# Patient Record
Sex: MALE | Race: WHITE | NOT HISPANIC OR LATINO | Employment: FULL TIME | URBAN - NONMETROPOLITAN AREA
[De-identification: names, ages, dates, MRNs, and addresses within clinical notes are randomized per-mention and may not be internally consistent; named-entity substitution may affect disease eponyms.]

---

## 2018-04-15 ENCOUNTER — HOSPITAL ENCOUNTER (EMERGENCY)
Facility: HOSPITAL | Age: 43
Discharge: HOME | End: 2018-04-15
Attending: NURSE PRACTITIONER | Admitting: NURSE PRACTITIONER
Payer: COMMERCIAL

## 2018-04-15 VITALS
HEART RATE: 85 BPM | HEIGHT: 73 IN | DIASTOLIC BLOOD PRESSURE: 89 MMHG | TEMPERATURE: 97.5 F | SYSTOLIC BLOOD PRESSURE: 138 MMHG | OXYGEN SATURATION: 99 % | RESPIRATION RATE: 14 BRPM

## 2018-04-15 DIAGNOSIS — J02.0 STREP THROAT: ICD-10-CM

## 2018-04-15 LAB
DEPRECATED S PYO AG THROAT QL EIA: ABNORMAL
SPECIMEN SOURCE: ABNORMAL

## 2018-04-15 PROCEDURE — 99203 OFFICE O/P NEW LOW 30 MIN: CPT | Performed by: NURSE PRACTITIONER

## 2018-04-15 PROCEDURE — 87880 STREP A ASSAY W/OPTIC: CPT | Performed by: FAMILY MEDICINE

## 2018-04-15 PROCEDURE — G0463 HOSPITAL OUTPT CLINIC VISIT: HCPCS

## 2018-04-15 RX ORDER — AMOXICILLIN 500 MG/1
500 CAPSULE ORAL 2 TIMES DAILY
Qty: 20 CAPSULE | Refills: 0 | Status: SHIPPED | OUTPATIENT
Start: 2018-04-15 | End: 2018-04-25

## 2018-04-15 ASSESSMENT — ENCOUNTER SYMPTOMS
RHINORRHEA: 1
SORE THROAT: 1
CHILLS: 1
APPETITE CHANGE: 0
FATIGUE: 1
FEVER: 1
DIAPHORESIS: 1

## 2018-04-15 NOTE — ED AVS SNAPSHOT
[facility]    [address] [place] [address]    Phone:  [phone]                                       Bert Naidu   MRN: [id number]    Department:  [facility]   Date of Visit:  4/15/2018           After Visit Summary Signature Page     I have received my discharge instructions, and my questions have been answered. I have discussed any challenges I see with this plan with the nurse or doctor.    ..........................................................................................................................................  Patient/Patient Representative Signature      ..........................................................................................................................................  Patient Representative Print Name and Relationship to Patient    ..................................................               ................................................  Date                                            Time    ..........................................................................................................................................  Reviewed by Signature/Title    ...................................................              ..............................................  Date                                                            Time

## 2018-04-15 NOTE — ED AVS SNAPSHOT
[facility]    [address] [address]    Phone:  [phone]                                       Bert Naidu   MRN: [id number]    Department:  [facility]   Date of Visit:  4/15/2018           Patient Information     Date Of Birth          1975        Your diagnoses for this visit were:     Strep throat        You were seen by Kaley Barker NP.      Follow-up Information     Follow up with [facility].    Specialty:  EMERGENCY MEDICINE    Why:  As needed, If symptoms worsen, or concerns develop    Contact information:    [address] [address] [phone]    Additional information:    From [place]: Take [address]. Turn left at [address]. Turn left at the first stoplight on [address]. At the first stop sign, take a right onto [address]. Take a left into the parking lot and continue through until you reach the North enterance of the building.       From [place]: Take [address]. Take the [address] ramp towards [place]. Turn left onto [address]. Take a slight right onto [address]. Turn left at the first stoplight on [address]. At the first stop sign, take a right onto [address]. Take a left into the parking lot and continue through until you reach the North enterance of the building.       From [place]: Take [address]. Take a right at [address]. At the first stop sign, take a right onto [address]. Take a left into the parking lot and continue through until you reach the North enterance of the building.         Follow up with No Ref-Primary, Physician.    Why:  As needed, if symptoms do not improve        Discharge Instructions         Pharyngitis: Strep (Confirmed)    You have had a positive test for strep throat. Strep throat is a contagious illness. It is spread by coughing, kissing or by touching others after  touching your mouth or nose. Symptoms include throat pain that is worse with swallowing, aching all over, headache, and fever. It is treated with antibiotic medicine. This should help you start to feel better in 1 to 2 days.  Home care    Rest at home. Drink plenty of fluids to you won't get dehydrated.    No work or school for the first 2 days of taking the antibiotics. After this time, you will not be contagious. You can then return to school or work if you are feeling better.     Take antibiotic medicine for the full 10 days, even if you feel better. This is very important to ensure the infection is treated. It is also important to prevent medicine-resistant germs from developing. If you were given an antibiotic shot, you don't need any more antibiotics.    You may use acetaminophen or ibuprofen to control pain or fever, unless another medicine was prescribed for this. Talk with your doctor before taking these medicines if you have chronic liver or kidney disease. Also talk with your doctor if you have had a stomach ulcer or GI bleeding.    Throat lozenges or sprays help reduce pain. Gargling with warm saltwater will also reduce throat pain. Dissolve 1/2 teaspoon of salt in 1 glass of warm water. This may be useful just before meals.     Soft foods are OK. Avoid salty or spicy foods.  Follow-up care  Follow up with your healthcare provider or our staff if you don't get better over the next week.  When to seek medical advice  Call your healthcare provider right away if any of these occur:    Fever of 100.4 F (38 C) or higher, or as directed by your healthcare provider    New or worsening ear pain, sinus pain, or headache    Painful lumps in the back of neck    Stiff neck    Lymph nodes getting larger or becoming soft in the middle    You can't swallow liquids or you can't open your mouth wide because of throat pain    Signs of dehydration. These include very dark urine or no urine, sunken eyes, and  "dizziness.    Trouble breathing or noisy breathing    Muffled voice    Rash  Date Last Reviewed: 4/13/2015 2000-2017 [employer]. [address] [place] [address], [place] [address]. All rights reserved. This information is not intended as a substitute for professional medical care. Always follow your healthcare professional's instructions.             Review of your medicines      START taking        Dose / Directions Last dose taken    amoxicillin 500 MG capsule   Commonly known as:  AMOXIL   Dose:  500 mg   Quantity:  20 capsule        Take 1 capsule (500 mg) by mouth 2 times daily for 10 days   Refills:  0                Prescriptions were sent or printed at these locations (1 Prescription)                   [facility] - [place], [place] - [address] AT [facility] [address]   [address], [place] [address]    Telephone:  [phone]   Fax:  [phone]   Hours:                  E-Prescribed (1 of 1)         amoxicillin (AMOXIL) 500 MG capsule                Procedures and tests performed during your visit     Rapid strep screen      Orders Needing Specimen Collection     None      Pending Results     No orders found from 4/13/2018 to 4/16/2018.            Pending Culture Results     No orders found from 4/13/2018 to 4/16/2018.            Thank you for choosing [facility]       Thank you for choosing [facility] for your care. Our goal is always to provide you with excellent care. Hearing back from our patients is one way we can continue to improve our services. Please take a few minutes to complete the written survey that you may receive in the mail after you visit with us. Thank you!        [employer] Information     "[employer] lets you send messages to your doctor, view your test results, renew your prescriptions, schedule appointments and more. To sign up, go to [email or web address] . Click on \"Log in\" on the left side of the screen, which will take you to the Welcome page. Then click " "on \"Sign up Now\" on the right side of the page.     You will be asked to enter the access code listed below, as well as some personal information. Please follow the directions to create your username and password.     Your access code is: [email or web address]  Expires: 2018 10:52 AM     Your access code will  in 90 days. If you need help or a new code, please call your [place] clinic or [phone].        Care EveryWhere ID     This is your Care EveryWhere ID. This could be used by other organizations to access your [place] medical records  [id number]        Equal Access to Services     [facility]: Hadjennifer Acosta, mary gee, jay jay luu, vira fernando . So [facility] [phone].    ATENCIÓN: Si habla español, tiene a carney disposición servicios gratuitos de asistencia lingüística. Llame al [phone].    We comply with applicable federal civil rights laws and [place] laws. We do not discriminate on the basis of race, color, national origin, age, disability, sex, sexual orientation, or gender identity.            After Visit Summary       This is your record. Keep this with you and show to your community pharmacist(s) and doctor(s) at your next visit.                  "

## 2018-04-15 NOTE — ED PROVIDER NOTES
"  History     Chief Complaint   Patient presents with     Pharyngitis     X 2 days with strep exposure     HPI  Bert Naidu is a 42 year old male who presents today with a CC of sore throat x 2 days.  He has been having chills and sweats,. No measured fevers.  He was exposed to daughter and grand child with strep.      Problem List:    There are no active problems to display for this patient.       Past Medical History:    History reviewed. No pertinent past medical history.    Past Surgical History:    No past surgical history on file.    Family History:    No family history on file.    Social History:  Marital Status:  Single [1]  Social History   Substance Use Topics     Smoking status: Not on file     Smokeless tobacco: Not on file     Alcohol use Not on file        Medications:      amoxicillin (AMOXIL) 500 MG capsule         Review of Systems   Constitutional: Positive for chills, diaphoresis, fatigue and fever. Negative for appetite change.   HENT: Positive for rhinorrhea and sore throat.        Physical Exam   BP: 138/89  Pulse: 85  Temp: 97.5  F (36.4  C)  Resp: 14  Height: 185.4 cm (6' 1\")  SpO2: 99 %      Physical Exam   Constitutional: He is oriented to person, place, and time. He appears well-developed. He is cooperative.   HENT:   Head: Normocephalic and atraumatic.   Right Ear: Tympanic membrane, external ear and ear canal normal.   Left Ear: Tympanic membrane, external ear and ear canal normal.   Mouth/Throat: Uvula is midline. No trismus in the jaw. Posterior oropharyngeal edema and posterior oropharyngeal erythema present. No tonsillar abscesses.   Eyes: Conjunctivae are normal.   Neck: Normal range of motion. Neck supple.   Cardiovascular: Normal rate and regular rhythm.    Pulmonary/Chest: Effort normal and breath sounds normal.   Lymphadenopathy:        Head (right side): Tonsillar adenopathy present.        Head (left side): Tonsillar adenopathy present.   Neurological: He is alert and " "oriented to person, place, and time.   Skin: Skin is warm and dry.   Psychiatric: He has a normal mood and affect. His behavior is normal.   Nursing note and vitals reviewed.      ED Course     ED Course     Procedures    Results for orders placed or performed during the hospital encounter of 04/15/18   Rapid strep screen   Result Value Ref Range    Specimen Description Throat     Rapid Strep A Screen (A)      POSITIVE: Group A Streptococcal antigen detected by immunoassay.       Assessments & Plan (with Medical Decision Making)     I have reviewed the nursing notes.    I have reviewed the findings, diagnosis, plan and need for follow up with the patient.  ASSESSMENT / PLAN:  (J02.0) Strep throat  Comment: rapid strep positive with positive exposure  Plan:  Amoxicillin as prescribed   Warm salt water gargles several times per day   Ibuprofen and tylenol per package directions for pain/fever   Cepacol lozenges OTC per package directions   Increase fluids, wash hands frequently, rest   Discard toothbrush after 24-48 hours and get a new one   Patient verbally educated and given appropriate education sheets for their diagnoses and has no questions.   Return to ED/UC if symptoms increase or concerns develop, red flag symptoms as discussed and per discharge instructions:    increasing throat pain, trouble swallowing, \"hot potato voice\", drooling, shortness of breath/airway compromise   Follow up with your Primary Care provider if symptoms do not improve in 2-3 days      New Prescriptions    AMOXICILLIN (AMOXIL) 500 MG CAPSULE    Take 1 capsule (500 mg) by mouth 2 times daily for 10 days       Final diagnoses:   Strep throat       4/15/2018   [facility]     Kaley Barker NP  04/16/18 1118    "

## 2018-04-15 NOTE — DISCHARGE INSTRUCTIONS
Pharyngitis: Strep (Confirmed)    You have had a positive test for strep throat. Strep throat is a contagious illness. It is spread by coughing, kissing or by touching others after touching your mouth or nose. Symptoms include throat pain that is worse with swallowing, aching all over, headache, and fever. It is treated with antibiotic medicine. This should help you start to feel better in 1 to 2 days.  Home care    Rest at home. Drink plenty of fluids to you won't get dehydrated.    No work or school for the first 2 days of taking the antibiotics. After this time, you will not be contagious. You can then return to school or work if you are feeling better.     Take antibiotic medicine for the full 10 days, even if you feel better. This is very important to ensure the infection is treated. It is also important to prevent medicine-resistant germs from developing. If you were given an antibiotic shot, you don't need any more antibiotics.    You may use acetaminophen or ibuprofen to control pain or fever, unless another medicine was prescribed for this. Talk with your doctor before taking these medicines if you have chronic liver or kidney disease. Also talk with your doctor if you have had a stomach ulcer or GI bleeding.    Throat lozenges or sprays help reduce pain. Gargling with warm saltwater will also reduce throat pain. Dissolve 1/2 teaspoon of salt in 1 glass of warm water. This may be useful just before meals.     Soft foods are OK. Avoid salty or spicy foods.  Follow-up care  Follow up with your healthcare provider or our staff if you don't get better over the next week.  When to seek medical advice  Call your healthcare provider right away if any of these occur:    Fever of 100.4 F (38 C) or higher, or as directed by your healthcare provider    New or worsening ear pain, sinus pain, or headache    Painful lumps in the back of neck    Stiff neck    Lymph nodes getting larger or becoming soft in the  middle    You can't swallow liquids or you can't open your mouth wide because of throat pain    Signs of dehydration. These include very dark urine or no urine, sunken eyes, and dizziness.    Trouble breathing or noisy breathing    Muffled voice    Rash  Date Last Reviewed: 4/13/2015 2000-2017 [employer]. [address] [place] [address], [place] [address]. All rights reserved. This information is not intended as a substitute for professional medical care. Always follow your healthcare professional's instructions.

## 2018-04-15 NOTE — ED NOTES
Pt presents today by self with c/o strep exposure two days ago. Pt states multiple family members were dx with strep recently.

## 2020-01-22 ENCOUNTER — HOSPITAL ENCOUNTER (EMERGENCY)
Facility: HOSPITAL | Age: 45
Discharge: HOME | End: 2020-01-22
Attending: NURSE PRACTITIONER | Admitting: NURSE PRACTITIONER
Payer: COMMERCIAL

## 2020-01-22 VITALS
HEIGHT: 72 IN | SYSTOLIC BLOOD PRESSURE: 137 MMHG | WEIGHT: 180 LBS | TEMPERATURE: 96.6 F | RESPIRATION RATE: 16 BRPM | OXYGEN SATURATION: 100 % | DIASTOLIC BLOOD PRESSURE: 96 MMHG | BODY MASS INDEX: 24.38 KG/M2

## 2020-01-22 DIAGNOSIS — R21 RASH: Primary | ICD-10-CM

## 2020-01-22 PROCEDURE — G0463 HOSPITAL OUTPT CLINIC VISIT: HCPCS

## 2020-01-22 PROCEDURE — 99213 OFFICE O/P EST LOW 20 MIN: CPT | Mod: Z6 | Performed by: NURSE PRACTITIONER

## 2020-01-22 RX ORDER — TRIAMCINOLONE ACETONIDE 1 MG/G
CREAM TOPICAL
Qty: 45 G | Refills: 0 | Status: SHIPPED | OUTPATIENT
Start: 2020-01-22 | End: 2020-02-05

## 2020-01-22 RX ORDER — PERMETHRIN 50 MG/G
CREAM TOPICAL ONCE
Qty: 30 G | Refills: 1 | Status: SHIPPED | OUTPATIENT
Start: 2020-01-22 | End: 2020-01-22

## 2020-01-22 RX ORDER — PREDNISONE 20 MG/1
TABLET ORAL
Qty: 10 TABLET | Refills: 0 | Status: SHIPPED | OUTPATIENT
Start: 2020-01-22 | End: 2021-06-27

## 2020-01-22 RX ORDER — CEPHALEXIN 500 MG/1
500 CAPSULE ORAL 4 TIMES DAILY
Qty: 28 CAPSULE | Refills: 0 | Status: SHIPPED | OUTPATIENT
Start: 2020-01-22 | End: 2020-01-29

## 2020-01-22 ASSESSMENT — MIFFLIN-ST. JEOR: SCORE: 1734.47

## 2020-01-22 NOTE — ED PROVIDER NOTES
"  History     Chief Complaint   Patient presents with     Wound Check     HPI  Bert Naidu is a 44 year old male who presents ambulatory with concerns of rash.      Rash  Onset: 1.5 months    Description:   Location: First area was on right medial wrist. 1 week ago started having more areas on forearm and on left hand, forearm  Character: red, round, raised  Itching (Pruritis): YES    Progression of Symptoms:  worsening    Accompanying Signs & Symptoms:  Fever: no   Body aches or joint pain: YES- wrists and hands. Denies ankle or feet pain  Sore throat symptoms: no   Recent cold symptoms: no     History:   Previous similar rash: no     Precipitating factors:   Exposure to similar rash: no   New exposures: None   Recent travel: no     Alleviating factors: nothing    Therapies Tried and outcome: bacitracin without relief, cortisone cream - somewhat helpful. Acetone - \"to kill the bugs\"      Allergies:  No Known Allergies    Problem List:    There are no active problems to display for this patient.       Past Medical History:    No past medical history on file.    Past Surgical History:    No past surgical history on file.    Family History:    No family history on file.    Social History:  Marital Status:  Single [1]  Social History     Tobacco Use     Smoking status: Not on file   Substance Use Topics     Alcohol use: Not on file     Drug use: Not on file        Medications:    cephALEXin (KEFLEX) 500 MG capsule  predniSONE (DELTASONE) 20 MG tablet  triamcinolone (KENALOG) 0.1 % external cream          Review of Systems   Constitutional: Negative for chills and fever.   HENT: Negative for congestion, rhinorrhea and sore throat.    Eyes: Negative for pain, discharge, redness and itching.   Respiratory: Negative for cough.    Gastrointestinal: Negative for abdominal pain, diarrhea, nausea and vomiting.   Musculoskeletal: Positive for arthralgias (wrists, hands) and joint swelling (wrists and hands).   Skin: Positive " for rash.   Neurological: Negative for headaches.   Psychiatric/Behavioral: Negative for self-injury and suicidal ideas.       Physical Exam   BP: 137/96  Heart Rate: 89  Temp: 96.6  F (35.9  C)  Resp: 16  Height: 182.9 cm (6')  Weight: 81.6 kg (180 lb)  SpO2: 100 %      Physical Exam  Constitutional:       General: He is not in acute distress.     Appearance: Normal appearance. He is not ill-appearing, toxic-appearing or diaphoretic.   HENT:      Head: Normocephalic and atraumatic.      Right Ear: Tympanic membrane, ear canal and external ear normal.      Left Ear: Tympanic membrane, ear canal and external ear normal.      Nose: Nose normal.      Mouth/Throat:      Lips: Pink.      Mouth: Mucous membranes are moist.      Pharynx: Oropharynx is clear. Uvula midline. No pharyngeal swelling, oropharyngeal exudate or posterior oropharyngeal erythema.   Eyes:      General: Lids are normal.      Conjunctiva/sclera: Conjunctivae normal.      Pupils: Pupils are equal, round, and reactive to light.   Neck:      Musculoskeletal: Neck supple.   Cardiovascular:      Rate and Rhythm: Normal rate and regular rhythm.      Heart sounds: S1 normal and S2 normal. No murmur. No friction rub. No gallop.    Pulmonary:      Effort: Pulmonary effort is normal. No tachypnea or respiratory distress.      Breath sounds: Normal breath sounds. No wheezing, rhonchi or rales.   Lymphadenopathy:      Cervical: No cervical adenopathy.   Skin:     General: Skin is warm and dry.      Capillary Refill: Capillary refill takes less than 2 seconds.      Coloration: Skin is not pale.      Findings: Rash present.      Comments: Varying sized annular raise plaques to hands and arms   Neurological:      Mental Status: He is alert and oriented to person, place, and time.      Gait: Gait is intact.   Psychiatric:         Mood and Affect: Mood is anxious.         Speech: Speech normal.         Behavior: Behavior normal. Behavior is cooperative.                     ED Course        Procedures          No results found for this or any previous visit (from the past 24 hour(s)).    Medications - No data to display    Assessments & Plan (with Medical Decision Making)     I have reviewed the nursing notes.    I have reviewed the findings, diagnosis, plan and need for follow up with the patient.  (R21) Rash  (primary encounter diagnosis)  Comment: Acute, symptomatic  Plan: He is concerned about bugs in his skin- actually, he is convinced that he has bugs in his skin. He has a pocket knife and is using it to remove bugs that are actually piece of dry skin. There is no convincing or educating him differently. He was seen at [facility] and told rash was consisnte with psoriasis and I agree with this diagnosis. Discussed that we can use permethrin although I do not think this is a parasite and strongly encouraged him to take antibiotics as I am concerned about some areas being infected likely from scratching and using a knife to poke at skin. He denies homicidal, suicidal ideations. Denies auditory hallucinations, elicit drug use.   Rash consistent with psoriasis and I am most concerned about a secondary infection given he is picking at areas  Start prednisone and antibiotic cephalexin as directed.  Triamcinolone to areas.    - Take entire course of antibiotic even if you start to feel better.  - Antibiotics can cause stomach upset including nausea and diarrhea. Read your bottle or ask the pharmacist if antibiotic can be taken with food to help prevent nausea. If you have symptoms of diarrhea you can take an over-the-counter probiotic and/or increase foods with probiotics such as yogurt, Yonis, sauerkraut.        RETURN TO THE ED WITH NEW OR WORSENING SYMPTOMS.    FOLLOW-UP WITH YOUR PRIMARY CARE PROVIDER IN 3-5 DAYS.      Discharge Medication List as of 1/22/2020 10:26 AM      START taking these medications    Details   cephALEXin (KEFLEX) 500 MG capsule Take 1 capsule (500  mg) by mouth 4 times daily for 7 days, Disp-28 capsule, R-0, E-Prescribe      permethrin (ELIMITE) 5 % external cream Apply topically once for 1 dose Massage into skin from head to foot.  Leave on for 8-14hrs and then wash off.  May repeat in 2 wks if needed.Disp-30 g, [id number]      predniSONE (DELTASONE) 20 MG tablet Take two tablets (= 40mg) each day for 5 (five) days, Disp-10 tablet, R-0, E-Prescribe      triamcinolone (KENALOG) 0.1 % external cream 80 gramsDisp-45 g, [id number]             Final diagnoses:   Rash     Ebony Myrick CNP   1/22/2020   [facility]     Ebony Myrick, CHELY  01/26/20 9223

## 2020-01-22 NOTE — ED AVS SNAPSHOT
[facility]  [address] [address]  Phone:  [phone]                                    Bert Naidu   MRN: [id number]    Department:  [facility]   Date of Visit:  1/22/2020           After Visit Summary Signature Page    I have received my discharge instructions, and my questions have been answered. I have discussed any challenges I see with this plan with the nurse or doctor.    ..........................................................................................................................................  Patient/Patient Representative Signature      ..........................................................................................................................................  Patient Representative Print Name and Relationship to Patient    ..................................................               ................................................  Date                                   Time    ..........................................................................................................................................  Reviewed by Signature/Title    ...................................................              ..............................................  Date                                               Time          22EPIC Rev 08/18

## 2020-01-22 NOTE — DISCHARGE INSTRUCTIONS
(R21) Rash  (primary encounter diagnosis)  Comment: Acute, symptomatic  Plan: he is concerned about bugs in his skin - can use permethrin although I do not think this is a parasite.  Rash consistent with psoriasis and I am most concerned about a secondary infection given he is picking at areas  Start prednisone and antibiotic cephalexin as directed.  Triamcinolone to areas.    - Take entire course of antibiotic even if you start to feel better.  - Antibiotics can cause stomach upset including nausea and diarrhea. Read your bottle or ask the pharmacist if antibiotic can be taken with food to help prevent nausea. If you have symptoms of diarrhea you can take an over-the-counter probiotic and/or increase foods with probiotics such as yogurt, Yonis, sauerkraut.        RETURN TO THE ED WITH NEW OR WORSENING SYMPTOMS.    FOLLOW-UP WITH YOUR PRIMARY CARE PROVIDER IN 3-5 DAYS.      Ebony Myrick, CNP

## 2020-01-22 NOTE — ED TRIAGE NOTES
Pt presents with reddened, raised wounds to his forearms that first stated a month ago. States that they itch and burn after he scratches them.

## 2020-01-26 ASSESSMENT — ENCOUNTER SYMPTOMS
DIARRHEA: 0
ABDOMINAL PAIN: 0
NAUSEA: 0
HEADACHES: 0
JOINT SWELLING: 1
CHILLS: 0
EYE REDNESS: 0
VOMITING: 0
FEVER: 0
ARTHRALGIAS: 1
RHINORRHEA: 0
SORE THROAT: 0
EYE ITCHING: 0
EYE PAIN: 0
EYE DISCHARGE: 0
COUGH: 0

## 2021-06-27 ENCOUNTER — HOSPITAL ENCOUNTER (EMERGENCY)
Facility: HOSPITAL | Age: 46
End: 2021-06-27
Payer: COMMERCIAL

## 2021-06-27 VITALS
HEART RATE: 76 BPM | DIASTOLIC BLOOD PRESSURE: 99 MMHG | SYSTOLIC BLOOD PRESSURE: 149 MMHG | OXYGEN SATURATION: 96 % | TEMPERATURE: 98.3 F | RESPIRATION RATE: 16 BRPM

## 2023-02-07 ENCOUNTER — HOSPITAL ENCOUNTER (EMERGENCY)
Facility: HOSPITAL | Age: 48
Discharge: HOME | End: 2023-02-07
Attending: PHYSICIAN ASSISTANT | Admitting: PHYSICIAN ASSISTANT
Payer: COMMERCIAL

## 2023-02-07 VITALS
HEART RATE: 91 BPM | SYSTOLIC BLOOD PRESSURE: 126 MMHG | OXYGEN SATURATION: 97 % | RESPIRATION RATE: 12 BRPM | TEMPERATURE: 98.3 F | DIASTOLIC BLOOD PRESSURE: 90 MMHG

## 2023-02-07 DIAGNOSIS — K08.89 PAIN, DENTAL: ICD-10-CM

## 2023-02-07 PROCEDURE — G0463 HOSPITAL OUTPT CLINIC VISIT: HCPCS

## 2023-02-07 PROCEDURE — 99213 OFFICE O/P EST LOW 20 MIN: CPT | Performed by: PHYSICIAN ASSISTANT

## 2023-02-07 RX ORDER — AMOXICILLIN 875 MG
875 TABLET ORAL 2 TIMES DAILY
Qty: 14 TABLET | Refills: 0 | Status: SHIPPED | OUTPATIENT
Start: 2023-02-07 | End: 2023-02-14

## 2023-02-07 NOTE — ED TRIAGE NOTES
Pt presents with c/o right lower dental pain. Pain started 3 days ago. Reports he does not have a primary dentist. Concerns about infection. States he needs his tooth pulled. Pt has been taking tylenol.

## 2023-02-07 NOTE — ED PROVIDER NOTES
History     Chief Complaint   Patient presents with     Dental Pain     HPI  Bert Naidu is a 47 year old male who presents to urgent care for evaluation of lower right dental pain.  Patient states that he has a longstanding history of poor dentition with all of his upper teeth previously have been removed along with the majority of his lower teeth.  Patient states that approximate 3 days ago he developed pain over the lower right aspect of his mouth.  Patient states he has also had a bad taste in his mouth and is concerned for infection.  He denies any fevers, fatigue, chills, myalgias, mechanism of injury, or any other associated symptoms.    Allergies:  No Known Allergies    Problem List:    There are no problems to display for this patient.       Past Medical History:    History reviewed. No pertinent past medical history.    Past Surgical History:    History reviewed. No pertinent surgical history.    Family History:    History reviewed. No pertinent family history.    Social History:  Marital Status:  Single [1]  Social History     Tobacco Use     Smoking status: Every Day     Packs/day: 0.50     Types: Cigarettes     Smokeless tobacco: Former     Types: Chew   Substance Use Topics     Alcohol use: Yes     Comment: occasional     Drug use: Not Currently     Types: Methamphetamines     Comment: quit a year ago        Medications:    amoxicillin (AMOXIL) 875 MG tablet          Review of Systems   HENT: Positive for dental problem.    All other systems reviewed and are negative.      Physical Exam   BP: 126/90  Pulse: 91  Temp: 98.3  F (36.8  C)  Resp: 12  SpO2: 97 %      Physical Exam  Vitals and nursing note reviewed.   Constitutional:       General: He is not in acute distress.     Appearance: Normal appearance. He is not ill-appearing or toxic-appearing.   HENT:      Mouth/Throat:        Comments: Patient has tenderness around tooth #27.  No obvious dental abscess.  No chipping or compromise to  enamel.  Cardiovascular:      Rate and Rhythm: Regular rhythm.      Heart sounds: Normal heart sounds.   Pulmonary:      Breath sounds: Normal breath sounds.   Neurological:      Mental Status: He is oriented to person, place, and time.         ED Course                 Procedures             Critical Care time:               No results found for this or any previous visit (from the past 24 hour(s)).    Medications - No data to display    Assessments & Plan (with Medical Decision Making)   #1.  Dental pain    Discussed exam findings with patient.  Patient is prescribed amoxicillin for suspected dental infection.  Patient was given a list of local dental resources and should follow-up when available.  Tylenol or ibuprofen as directed for pain along with warm salt water gargles and rinses.  Any additional concerns the meantime patient can return to urgent care or follow-up with primary care provider.  Patient verbalized understanding and agreement of plan.    I have reviewed the nursing notes.    I have reviewed the findings, diagnosis, plan and need for follow up with the patient.        Discharge Medication List as of 2/7/2023 12:25 PM      START taking these medications    Details   amoxicillin (AMOXIL) 875 MG tablet Take 1 tablet (875 mg) by mouth 2 times daily for 7 days, Disp-14 tablet, R-0, E-Prescribe             Final diagnoses:   Pain, dental       2/7/2023   [facility]     Tobin Bose PA-C  02/07/23 [id number]

## 2023-02-07 NOTE — ED TRIAGE NOTES
Pt presents with right lower tooth pain. Pt states pain for past 3 days and is trying to find a dentist.

## 2024-05-31 ENCOUNTER — HOSPITAL ENCOUNTER (EMERGENCY)
Facility: HOSPITAL | Age: 49
Discharge: HOME | End: 2024-05-31
Attending: EMERGENCY MEDICINE | Admitting: EMERGENCY MEDICINE
Payer: COMMERCIAL

## 2024-05-31 VITALS
TEMPERATURE: 97.4 F | HEART RATE: 74 BPM | RESPIRATION RATE: 16 BRPM | OXYGEN SATURATION: 96 % | SYSTOLIC BLOOD PRESSURE: 142 MMHG | DIASTOLIC BLOOD PRESSURE: 91 MMHG

## 2024-05-31 DIAGNOSIS — F22 PARANOIA (H): ICD-10-CM

## 2024-05-31 DIAGNOSIS — F23 ACUTE PSYCHOSIS (H): ICD-10-CM

## 2024-05-31 PROBLEM — F15.950: Status: ACTIVE | Noted: 2024-05-31

## 2024-05-31 LAB
ALBUMIN SERPL BCG-MCNC: 4.2 G/DL (ref 3.5–5.2)
ALP SERPL-CCNC: 87 U/L (ref 40–150)
ALT SERPL W P-5'-P-CCNC: 9 U/L (ref 0–70)
ANION GAP SERPL CALCULATED.3IONS-SCNC: 12 MMOL/L (ref 7–15)
APAP SERPL-MCNC: <5 UG/ML (ref 10–30)
AST SERPL W P-5'-P-CCNC: 19 U/L (ref 0–45)
BASOPHILS # BLD AUTO: 0.1 10E3/UL (ref 0–0.2)
BASOPHILS NFR BLD AUTO: 1 %
BILIRUB SERPL-MCNC: 0.7 MG/DL
BUN SERPL-MCNC: 15.8 MG/DL (ref 6–20)
CALCIUM SERPL-MCNC: 8.9 MG/DL (ref 8.6–10)
CHLORIDE SERPL-SCNC: 105 MMOL/L (ref 98–107)
CREAT SERPL-MCNC: 1.08 MG/DL (ref 0.67–1.17)
DEPRECATED HCO3 PLAS-SCNC: 23 MMOL/L (ref 22–29)
EGFRCR SERPLBLD CKD-EPI 2021: 84 ML/MIN/1.73M2
EOSINOPHIL # BLD AUTO: 0.1 10E3/UL (ref 0–0.7)
EOSINOPHIL NFR BLD AUTO: 4 %
ERYTHROCYTE [DISTWIDTH] IN BLOOD BY AUTOMATED COUNT: 13.3 % (ref 10–15)
ETHANOL SERPL-MCNC: <0.01 G/DL
GLUCOSE SERPL-MCNC: 88 MG/DL (ref 70–99)
HCT VFR BLD AUTO: 47 % (ref 40–53)
HGB BLD-MCNC: 15.4 G/DL (ref 13.3–17.7)
HOLD SPECIMEN: NORMAL
HOLD SPECIMEN: NORMAL
IMM GRANULOCYTES # BLD: 0 10E3/UL
IMM GRANULOCYTES NFR BLD: 0 %
LYMPHOCYTES # BLD AUTO: 1.3 10E3/UL (ref 0.8–5.3)
LYMPHOCYTES NFR BLD AUTO: 36 %
MCH RBC QN AUTO: 30.6 PG (ref 26.5–33)
MCHC RBC AUTO-ENTMCNC: 32.8 G/DL (ref 31.5–36.5)
MCV RBC AUTO: 93 FL (ref 78–100)
MONOCYTES # BLD AUTO: 0.5 10E3/UL (ref 0–1.3)
MONOCYTES NFR BLD AUTO: 13 %
NEUTROPHILS # BLD AUTO: 1.6 10E3/UL (ref 1.6–8.3)
NEUTROPHILS NFR BLD AUTO: 46 %
NRBC # BLD AUTO: 0 10E3/UL
NRBC BLD AUTO-RTO: 0 /100
PLATELET # BLD AUTO: 223 10E3/UL (ref 150–450)
POTASSIUM SERPL-SCNC: 4.4 MMOL/L (ref 3.4–5.3)
PROT SERPL-MCNC: 6.9 G/DL (ref 6.4–8.3)
RBC # BLD AUTO: 5.03 10E6/UL (ref 4.4–5.9)
SALICYLATES SERPL-MCNC: <0.3 MG/DL
SODIUM SERPL-SCNC: 140 MMOL/L (ref 135–145)
WBC # BLD AUTO: 3.5 10E3/UL (ref 4–11)

## 2024-05-31 PROCEDURE — 80179 DRUG ASSAY SALICYLATE: CPT | Performed by: EMERGENCY MEDICINE

## 2024-05-31 PROCEDURE — 80053 COMPREHEN METABOLIC PANEL: CPT | Performed by: EMERGENCY MEDICINE

## 2024-05-31 PROCEDURE — 82077 ASSAY SPEC XCP UR&BREATH IA: CPT | Performed by: EMERGENCY MEDICINE

## 2024-05-31 PROCEDURE — 99285 EMERGENCY DEPT VISIT HI MDM: CPT

## 2024-05-31 PROCEDURE — 80143 DRUG ASSAY ACETAMINOPHEN: CPT | Performed by: EMERGENCY MEDICINE

## 2024-05-31 PROCEDURE — 36415 COLL VENOUS BLD VENIPUNCTURE: CPT | Performed by: EMERGENCY MEDICINE

## 2024-05-31 PROCEDURE — 85025 COMPLETE CBC W/AUTO DIFF WBC: CPT | Performed by: EMERGENCY MEDICINE

## 2024-05-31 PROCEDURE — 99284 EMERGENCY DEPT VISIT MOD MDM: CPT | Performed by: EMERGENCY MEDICINE

## 2024-05-31 ASSESSMENT — ACTIVITIES OF DAILY LIVING (ADL)
ADLS_ACUITY_SCORE: 35

## 2024-05-31 ASSESSMENT — COLUMBIA-SUICIDE SEVERITY RATING SCALE - C-SSRS
1. IN THE PAST MONTH, HAVE YOU WISHED YOU WERE DEAD OR WISHED YOU COULD GO TO SLEEP AND NOT WAKE UP?: NO
2. HAVE YOU ACTUALLY HAD ANY THOUGHTS OF KILLING YOURSELF IN THE PAST MONTH?: NO
6. HAVE YOU EVER DONE ANYTHING, STARTED TO DO ANYTHING, OR PREPARED TO DO ANYTHING TO END YOUR LIFE?: NO
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
## 2024-05-31 NOTE — ED TRIAGE NOTES
Patient has been feeling he is being watched and followed over the last 4 years. He is starting to miss work and feels lost. Other circumstances in his life are adding to the stress levels.

## 2024-05-31 NOTE — DISCHARGE INSTRUCTIONS
[facility] SCHEDULING:  Today you were seen by a licensed mental health professional through [facility], [facility] ([facility])  for a crisis assessment in the Emergency Department at [facility].  It is recommended that you follow up with your estabished providers (psychiatrist, mental health therapist, and/or primary care doctor - as relevant) as soon as possible. Coordinators from [facility] will be calling you in the next 24-48 hours to ensure that you have the resources you need.  You can also contact [facility] coordinators directly at [phone].    You have been scheduled the following appointments:     Date: Tuesday, 6/4/2024  Time: 9:00 am - 10:00 am  Provider: Jens Perez PsyD, CNP,PMROBERTOP,RN  Location: [facility], [address] [place] [address], [address], [place] [address]  Phone: [phone]  Type: Telepsychiatry    Date: Wednesday, 6/5/2024  Time: 10:00 am - 11:00 am  Provider: Antonieta Ness MA  Location: [facility] [address] [facility], [address], [place] [address]  Phone: [phone]  Type: Teletherapy    Patient Instructions  Please fill New Patient Form by using following link. All forms need to be completed 24 hours prior to the appointment date/time by going to [email or web address] Please call us on [phone] 96 hours prior to your scheduled appointment to confirm that you are able to attend. We will provide you information about how to log into video call software when you call.      [facility] maintains an extensive network of licensed behavioral health providers to connect patients with the services they need.  We do not charge providers a fee to participate in our referral network.  We match patients with providers based on a patient s specific needs, insurance coverage, and location.  Our first effort will be to refer you to a provider within your care system, and will utilize providers outside your care system as needed.

## 2024-05-31 NOTE — ED PROVIDER NOTES
S: Pt is a 49 yo male who presents to the ED with CC of mental health problem.  States that he feels like people are watching and following him for the last 4 years, and now it is interfering with his work.  He has been calling in to work lately.  States that he does still have a job, but they will not take him back until he gets evaluated for these symptoms.  Pt does have a hx of alcohol, THC, and meth use but states these are coping mechanisms for him.  Denies any recent physical complaints or cough/cold symptoms.  Denies suicidal or homicidal thoughts or intent.         Complete multisystem ROS except as mentioned above negative.  History reviewed. No pertinent past medical history.  There is no problem list on file for this patient.    History reviewed. No pertinent surgical history.  History reviewed. No pertinent family history.  Social History     Tobacco Use    Smoking status: Every Day     Types: Vaping Device    Smokeless tobacco: Former     Types: Chew   Substance Use Topics    Alcohol use: Yes     Comment: occasional    Drug use: Not Currently     Types: Methamphetamines, Marijuana     Comment: within the last week     O:/91   Pulse 74   Temp 97.4  F (36.3  C) (Tympanic)   Resp 16   SpO2 96%   Gen - pleasant, cooperative, in NAD  Neuro - A&O x 3, CN II-XII intact, gait is steady  HEENT - PERRLA bilaterally, EOMI bilaterally, no conjunctival injection, essentially edentulous  CV - RRR with no murmurs, clicks, or rubs  Resp -  CTAB with no wheezes  Abd - soft, NT, nl BS, no distention  Extr - no LE edema  Skin - w/d/i    Results for orders placed or performed during the hospital encounter of 05/31/24   Comprehensive metabolic panel     Status: Normal   Result Value Ref Range    Sodium 140 135 - 145 mmol/L    Potassium 4.4 3.4 - 5.3 mmol/L    Carbon Dioxide (CO2) 23 22 - 29 mmol/L    Anion Gap 12 7 - 15 mmol/L    Urea Nitrogen 15.8 6.0 - 20.0 mg/dL    Creatinine 1.08 0.67 - 1.17 mg/dL    GFR  Estimate 84 >60 mL/min/1.73m2    Calcium 8.9 8.6 - 10.0 mg/dL    Chloride 105 98 - 107 mmol/L    Glucose 88 70 - 99 mg/dL    Alkaline Phosphatase 87 40 - 150 U/L    AST 19 0 - 45 U/L    ALT 9 0 - 70 U/L    Protein Total 6.9 6.4 - 8.3 g/dL    Albumin 4.2 3.5 - 5.2 g/dL    Bilirubin Total 0.7 <=1.2 mg/dL   Ethyl Alcohol Level     Status: Normal   Result Value Ref Range    Alcohol ethyl <0.01 <=0.01 g/dL   Acetaminophen level     Status: Abnormal   Result Value Ref Range    Acetaminophen <5.0 (L) 10.0 - 30.0 ug/mL   Salicylate level     Status: Normal   Result Value Ref Range    Salicylate <0.3   mg/dL   CBC with platelets and differential     Status: Abnormal   Result Value Ref Range    WBC Count 3.5 (L) 4.0 - 11.0 10e3/uL    RBC Count 5.03 4.40 - 5.90 10e6/uL    Hemoglobin 15.4 13.3 - 17.7 g/dL    Hematocrit 47.0 40.0 - 53.0 %    MCV 93 78 - 100 fL    MCH 30.6 26.5 - 33.0 pg    MCHC 32.8 31.5 - 36.5 g/dL    RDW 13.3 10.0 - 15.0 %    Platelet Count 223 150 - 450 10e3/uL    % Neutrophils 46 %    % Lymphocytes 36 %    % Monocytes 13 %    % Eosinophils 4 %    % Basophils 1 %    % Immature Granulocytes 0 %    NRBCs per 100 WBC 0 <1 /100    Absolute Neutrophils 1.6 1.6 - 8.3 10e3/uL    Absolute Lymphocytes 1.3 0.8 - 5.3 10e3/uL    Absolute Monocytes 0.5 0.0 - 1.3 10e3/uL    Absolute Eosinophils 0.1 0.0 - 0.7 10e3/uL    Absolute Basophils 0.1 0.0 - 0.2 10e3/uL    Absolute Immature Granulocytes 0.0 <=0.4 10e3/uL    Absolute NRBCs 0.0 10e3/uL   Extra Tube     Status: None    Narrative    The following orders were created for panel order Extra Tube.  Procedure                               Abnormality         Status                     ---------                               -----------         ------                     Extra Blue Top Tube[[id number]]                              Final result               Extra Green Top (Lithium...[[id number]]                      Final result                 Please view results for these  tests on the individual orders.   Extra Blue Top Tube     Status: None   Result Value Ref Range    Hold Specimen JIC    Extra Green Top (Lithium Heparin) Tube     Status: None   Result Value Ref Range    Hold Specimen JIC    CBC with platelets differential     Status: Abnormal    Narrative    The following orders were created for panel order CBC with platelets differential.  Procedure                               Abnormality         Status                     ---------                               -----------         ------                     CBC with platelets and d...[[id number]]  Abnormal            Final result                 Please view results for these tests on the individual orders.       A:   1. Acute psychosis (H)    2. Paranoia (H)        P: Pt is medically cleared in the ED      [facility] assessment is completed - pt is set up for psych and therapist appts 6/4 and 6/5                                                        - pt declines offer of CD tmt at this time                                                        - pt deemed safe for discharge to home as he is not suicidal or homicidal     Alfreda Tatum,   05/31/24 0226

## 2024-07-14 ENCOUNTER — HOSPITAL ENCOUNTER (INPATIENT)
Facility: HOSPITAL | Age: 49
LOS: 3 days | Discharge: HOME | End: 2024-07-17
Attending: NURSE PRACTITIONER | Admitting: STUDENT IN AN ORGANIZED HEALTH CARE EDUCATION/TRAINING PROGRAM
Payer: COMMERCIAL

## 2024-07-14 ENCOUNTER — TELEPHONE (OUTPATIENT)
Dept: BEHAVIORAL HEALTH | Facility: CLINIC | Age: 49
End: 2024-07-14

## 2024-07-14 DIAGNOSIS — F15.950 AMPHETAMINE-INDUCED PSYCHOTIC DISORDER, WITH DELUSIONS (H): Primary | ICD-10-CM

## 2024-07-14 DIAGNOSIS — R44.3 HALLUCINATIONS: ICD-10-CM

## 2024-07-14 PROBLEM — F39 UNSPECIFIED MOOD (AFFECTIVE) DISORDER (H): Status: ACTIVE | Noted: 2024-07-14

## 2024-07-14 LAB
ALBUMIN SERPL BCG-MCNC: 4.6 G/DL (ref 3.5–5.2)
ALBUMIN UR-MCNC: 30 MG/DL
ALP SERPL-CCNC: 81 U/L (ref 40–150)
ALT SERPL W P-5'-P-CCNC: 15 U/L (ref 0–70)
AMPHETAMINES UR QL SCN: ABNORMAL
ANION GAP SERPL CALCULATED.3IONS-SCNC: 9 MMOL/L (ref 7–15)
APPEARANCE UR: CLEAR
AST SERPL W P-5'-P-CCNC: 30 U/L (ref 0–45)
BARBITURATES UR QL SCN: ABNORMAL
BASOPHILS # BLD AUTO: 0 10E3/UL (ref 0–0.2)
BASOPHILS NFR BLD AUTO: 1 %
BENZODIAZ UR QL SCN: ABNORMAL
BILIRUB SERPL-MCNC: 0.6 MG/DL
BILIRUB UR QL STRIP: NEGATIVE
BUN SERPL-MCNC: 12.7 MG/DL (ref 6–20)
BZE UR QL SCN: ABNORMAL
CALCIUM SERPL-MCNC: 8.9 MG/DL (ref 8.6–10)
CANNABINOIDS UR QL SCN: ABNORMAL
CHLORIDE SERPL-SCNC: 111 MMOL/L (ref 98–107)
COLOR UR AUTO: YELLOW
CREAT SERPL-MCNC: 1.29 MG/DL (ref 0.67–1.17)
DEPRECATED HCO3 PLAS-SCNC: 26 MMOL/L (ref 22–29)
EGFRCR SERPLBLD CKD-EPI 2021: 68 ML/MIN/1.73M2
EOSINOPHIL # BLD AUTO: 0.1 10E3/UL (ref 0–0.7)
EOSINOPHIL NFR BLD AUTO: 2 %
ERYTHROCYTE [DISTWIDTH] IN BLOOD BY AUTOMATED COUNT: 13.4 % (ref 10–15)
ETHANOL SERPL-MCNC: <0.01 G/DL
FENTANYL UR QL: ABNORMAL
GLUCOSE SERPL-MCNC: 131 MG/DL (ref 70–99)
GLUCOSE UR STRIP-MCNC: 30 MG/DL
HCT VFR BLD AUTO: 40 % (ref 40–53)
HGB BLD-MCNC: 13.3 G/DL (ref 13.3–17.7)
HGB UR QL STRIP: NEGATIVE
HOLD SPECIMEN: NORMAL
IMM GRANULOCYTES # BLD: 0 10E3/UL
IMM GRANULOCYTES NFR BLD: 0 %
KETONES UR STRIP-MCNC: NEGATIVE MG/DL
LEUKOCYTE ESTERASE UR QL STRIP: ABNORMAL
LYMPHOCYTES # BLD AUTO: 0.9 10E3/UL (ref 0.8–5.3)
LYMPHOCYTES NFR BLD AUTO: 19 %
MCH RBC QN AUTO: 31.4 PG (ref 26.5–33)
MCHC RBC AUTO-ENTMCNC: 33.3 G/DL (ref 31.5–36.5)
MCV RBC AUTO: 94 FL (ref 78–100)
MONOCYTES # BLD AUTO: 0.6 10E3/UL (ref 0–1.3)
MONOCYTES NFR BLD AUTO: 11 %
MUCOUS THREADS #/AREA URNS LPF: PRESENT /LPF
NEUTROPHILS # BLD AUTO: 3.3 10E3/UL (ref 1.6–8.3)
NEUTROPHILS NFR BLD AUTO: 67 %
NITRATE UR QL: NEGATIVE
NRBC # BLD AUTO: 0 10E3/UL
NRBC BLD AUTO-RTO: 0 /100
OPIATES UR QL SCN: ABNORMAL
PCP QUAL URINE (ROCHE): ABNORMAL
PH UR STRIP: 5.5 [PH] (ref 4.7–8)
PLATELET # BLD AUTO: 230 10E3/UL (ref 150–450)
POTASSIUM SERPL-SCNC: 3.7 MMOL/L (ref 3.4–5.3)
PROT SERPL-MCNC: 7.4 G/DL (ref 6.4–8.3)
RBC # BLD AUTO: 4.24 10E6/UL (ref 4.4–5.9)
RBC URINE: <1 /HPF
SODIUM SERPL-SCNC: 146 MMOL/L (ref 135–145)
SP GR UR STRIP: 1.03 (ref 1–1.03)
SQUAMOUS EPITHELIAL: 0 /HPF
UROBILINOGEN UR STRIP-MCNC: NORMAL MG/DL
WBC # BLD AUTO: 4.9 10E3/UL (ref 4–11)
WBC URINE: 6 /HPF

## 2024-07-14 PROCEDURE — 99285 EMERGENCY DEPT VISIT HI MDM: CPT

## 2024-07-14 PROCEDURE — 82077 ASSAY SPEC XCP UR&BREATH IA: CPT | Performed by: NURSE PRACTITIONER

## 2024-07-14 PROCEDURE — 36415 COLL VENOUS BLD VENIPUNCTURE: CPT | Performed by: NURSE PRACTITIONER

## 2024-07-14 PROCEDURE — 99285 EMERGENCY DEPT VISIT HI MDM: CPT | Performed by: NURSE PRACTITIONER

## 2024-07-14 PROCEDURE — 80053 COMPREHEN METABOLIC PANEL: CPT | Performed by: NURSE PRACTITIONER

## 2024-07-14 PROCEDURE — 85025 COMPLETE CBC W/AUTO DIFF WBC: CPT | Performed by: NURSE PRACTITIONER

## 2024-07-14 PROCEDURE — 81001 URINALYSIS AUTO W/SCOPE: CPT | Performed by: NURSE PRACTITIONER

## 2024-07-14 PROCEDURE — 124N000001 HC R&B MH

## 2024-07-14 PROCEDURE — 80307 DRUG TEST PRSMV CHEM ANLYZR: CPT | Performed by: NURSE PRACTITIONER

## 2024-07-14 ASSESSMENT — ENCOUNTER SYMPTOMS
HALLUCINATIONS: 1
EYES NEGATIVE: 1
GASTROINTESTINAL NEGATIVE: 1
MUSCULOSKELETAL NEGATIVE: 1
ALLERGIC/IMMUNOLOGIC NEGATIVE: 1
CONSTITUTIONAL NEGATIVE: 1
HEMATOLOGIC/LYMPHATIC NEGATIVE: 1
ENDOCRINE NEGATIVE: 1
CARDIOVASCULAR NEGATIVE: 1
RESPIRATORY NEGATIVE: 1
NEUROLOGICAL NEGATIVE: 1

## 2024-07-14 ASSESSMENT — ACTIVITIES OF DAILY LIVING (ADL)
ADLS_ACUITY_SCORE: 35

## 2024-07-14 NOTE — ED PROVIDER NOTES
History     Chief Complaint   Patient presents with    Psychiatric Evaluation     HPI  Bert Naidu is a 48 year old individual with history of ketamine induced psychotic disorder comes in via law enforcement for psychiatric evaluation.  Patient apparently has been seeing and hearing things.  Apparently patient was taking a pistol and shooting in his yard.  While enforcement was called.  Patient did come in but was not pleased about it.   Patient does admit to smoking methamphetamine.  Denies hallucinations.  Denies any suicidal or homicidal ideation.  Denies any alcohol use.    Allergies:  No Known Allergies    Problem List:    Patient Active Problem List    Diagnosis Date Noted    Hallucinations 07/14/2024     Priority: Medium    Amphetamine-induced psychotic disorder, with delusions (H) 05/31/2024     Priority: Medium        Past Medical History:    No past medical history on file.    Past Surgical History:    No past surgical history on file.    Family History:    No family history on file.    Social History:  Marital Status:  Single [1]  Social History     Tobacco Use    Smoking status: Every Day     Types: Vaping Device    Smokeless tobacco: Former     Types: Chew   Substance Use Topics    Alcohol use: Yes     Comment: occasional    Drug use: Not Currently     Types: Methamphetamines, Marijuana     Comment: within the last week        Medications:    No current outpatient medications on file.        Review of Systems   Constitutional: Negative.    HENT: Negative.     Eyes: Negative.    Respiratory: Negative.     Cardiovascular: Negative.    Gastrointestinal: Negative.    Endocrine: Negative.    Genitourinary: Negative.    Musculoskeletal: Negative.    Skin: Negative.    Allergic/Immunologic: Negative.    Neurological: Negative.    Hematological: Negative.    Psychiatric/Behavioral:  Positive for hallucinations. Negative for suicidal ideas.        Physical Exam   BP: (!) 149/107  Pulse: 88  Temp: 98.1  F  (36.7  C)  Resp: 17  SpO2: 97 %      GENERAL APPEARANCE:  The patient is a 48 year old well-developed, well-nourished individual that appears as stated age.  NECK:  Supple.  Trachea is midline.    LUNGS:  Breathing is easy.  Breath sounds are equal and clear bilaterally.  No wheezes, rhonchi, or rales.  HEART:  Regular rate and rhythm with normal S1 and S2.  No murmurs, gallops, or rubs.  NEUROLOGIC:  No focal sensory or motor deficits are noted.    PSYCHIATRIC:   Gait and Station: Normal  Psychomotor Behavior:  no evidence of tardive dyskinesia, dystonia, or tics  Attention Span and Concentration:  intact  Eye Contact:  good  Speech:  clear, coherent  Mood: Flat  Affect:  intensity is flat  Thought Process:  logical  Thought Content:  no evidence of suicidal ideation or homicidal ideation, no auditory hallucinations present, and visual hallucinations present  Oriented to:  time, person, and place  Recent and Remote Memory:  intact  Judgment:   Not assessed  Attitude:  guarded  Insight:   Not assessed    SKIN:  Warm, dry, and well perfused.  Good turgor.  No lesions, nodules, or rashes are noted.  No bruising noted.      Comment: Discrepancies between my note and notes on behalf of the nursing team or other care providers are secondary to my findings reflecting my physical examination and questioning of the patient.  Any conflicting information provided is not in line with my examination of the patient.       ED Course     ED Course as of 07/14/24 2009   Sun Jul 14, 2024 1848 In to see patient and history/physical completed.    1854 Labs ordered.   1902 DEC assessment ordered   1933 DEC  recommends patient having evaluation by psychiatry.  For this reason discussed case with [facility] psych provider Francine Baptiste.  Patient accepted for admission to [facility] psych floor evaluation by psychiatry.                 Results for orders placed or performed during the hospital encounter of 07/14/24 (from the past 24  hour(s))   CBC with platelets differential    Narrative    The following orders were created for panel order CBC with platelets differential.  Procedure                               Abnormality         Status                     ---------                               -----------         ------                     CBC with platelets and d...[[id number]]  Abnormal            Final result                 Please view results for these tests on the individual orders.   Comprehensive metabolic panel   Result Value Ref Range    Sodium 146 (H) 135 - 145 mmol/L    Potassium 3.7 3.4 - 5.3 mmol/L    Carbon Dioxide (CO2) 26 22 - 29 mmol/L    Anion Gap 9 7 - 15 mmol/L    Urea Nitrogen 12.7 6.0 - 20.0 mg/dL    Creatinine 1.29 (H) 0.67 - 1.17 mg/dL    GFR Estimate 68 >60 mL/min/1.73m2    Calcium 8.9 8.6 - 10.0 mg/dL    Chloride 111 (H) 98 - 107 mmol/L    Glucose 131 (H) 70 - 99 mg/dL    Alkaline Phosphatase 81 40 - 150 U/L    AST 30 0 - 45 U/L    ALT 15 0 - 70 U/L    Protein Total 7.4 6.4 - 8.3 g/dL    Albumin 4.6 3.5 - 5.2 g/dL    Bilirubin Total 0.6 <=1.2 mg/dL   Ethyl Alcohol Level   Result Value Ref Range    Alcohol ethyl <0.01 <=0.01 g/dL   CBC with platelets and differential   Result Value Ref Range    WBC Count 4.9 4.0 - 11.0 10e3/uL    RBC Count 4.24 (L) 4.40 - 5.90 10e6/uL    Hemoglobin 13.3 13.3 - 17.7 g/dL    Hematocrit 40.0 40.0 - 53.0 %    MCV 94 78 - 100 fL    MCH 31.4 26.5 - 33.0 pg    MCHC 33.3 31.5 - 36.5 g/dL    RDW 13.4 10.0 - 15.0 %    Platelet Count 230 150 - 450 10e3/uL    % Neutrophils 67 %    % Lymphocytes 19 %    % Monocytes 11 %    % Eosinophils 2 %    % Basophils 1 %    % Immature Granulocytes 0 %    NRBCs per 100 WBC 0 <1 /100    Absolute Neutrophils 3.3 1.6 - 8.3 10e3/uL    Absolute Lymphocytes 0.9 0.8 - 5.3 10e3/uL    Absolute Monocytes 0.6 0.0 - 1.3 10e3/uL    Absolute Eosinophils 0.1 0.0 - 0.7 10e3/uL    Absolute Basophils 0.0 0.0 - 0.2 10e3/uL    Absolute Immature Granulocytes 0.0 <=0.4 10e3/uL     Absolute NRBCs 0.0 10e3/uL   Extra Tube    Narrative    The following orders were created for panel order Extra Tube.  Procedure                               Abnormality         Status                     ---------                               -----------         ------                     Extra Blue Top Tube[[id number]]                              In process                 Extra Red Top Tube[[id number]]                               In process                 Extra Heparinized Syringe[[id number]]                        Final result                 Please view results for these tests on the individual orders.   Extra Heparinized Syringe   Result Value Ref Range    Hold Specimen Ok    Urine Drug Screen    Narrative    The following orders were created for panel order Urine Drug Screen.  Procedure                               Abnormality         Status                     ---------                               -----------         ------                     Urine Drug Screen Panel[[id number]]      Abnormal            Final result                 Please view results for these tests on the individual orders.   UA with Microscopic reflex to Culture    Specimen: Urine, NOS   Result Value Ref Range    Color Urine Yellow Colorless, Straw, Light Yellow, Yellow    Appearance Urine Clear Clear    Glucose Urine 30 (A) Negative mg/dL    Bilirubin Urine Negative Negative    Ketones Urine Negative Negative mg/dL    Specific Gravity Urine 1.030 1.003 - 1.035    Blood Urine Negative Negative    pH Urine 5.5 4.7 - 8.0    Protein Albumin Urine 30 (A) Negative mg/dL    Urobilinogen Urine Normal Normal, 2.0 mg/dL    Nitrite Urine Negative Negative    Leukocyte Esterase Urine Small (A) Negative    Mucus Urine Present (A) None Seen /LPF    RBC Urine <1 <=2 /HPF    WBC Urine 6 (H) <=5 /HPF    Squamous Epithelials Urine 0 <=1 /HPF    Narrative    Urine Culture not indicated   Urine Drug Screen Panel   Result Value Ref Range    Amphetamines  Urine Screen Positive (A) Screen Negative    Barbituates Urine Screen Negative Screen Negative    Benzodiazepine Urine Screen Negative Screen Negative    Cannabinoids Urine Screen Negative Screen Negative    Cocaine Urine Screen Negative Screen Negative    Fentanyl Qual Urine Screen Negative Screen Negative    Opiates Urine Screen Negative Screen Negative    PCP Urine Screen Negative Screen Negative       Medications - No data to display    Assessments & Plan (with Medical Decision Making)     I have reviewed the nursing notes.    I have reviewed the findings, diagnosis, plan and need for follow up with the patient.    Summary:  Patient presents to the ER today hallucinations.  Potential diagnosis which have been considered and evaluated include drug ingestion, psychiatric disorder, electrolyte abnormality, metabolic encephalopathy, as well as others. Many of these have been excluded using the various modalities and assessment as noted on the chart. At the present time, the diagnosis given seems to be the most likely hallucinations.  Upon arrival, vitals signs are normal.  The patient is alert and oriented but apparently is having hallucinations that are auditory and visual.  Denies any suicidal ideation.  Patient was placed on law enforcement hold upon arrival.  Lab work obtained showing WBC of 4.9 with hemoglobin 13.3.  Electrolytes, renal, hepatic functions benign.  Alcohol negative.  Drug screen positive for amphetamines.  DEC  did speak with patient.  Does  stool that patient is having psychotic thoughts but not suicidal or homicidal.  Does request if patient can be seen by psychiatrist to have that done.  For this reason discussed case with [facility] psych provider Francine Baptiste.  Patient accepted for [facility] psych admission so can be seen by psychiatrist tomorrow in regards to hallucinations and further evaluation/treatment.        Critical Care Time: None    Impression and plan discussed with  patient. Questions answered, concerns addressed, indications for urgent re-evaluation reviewed, and  given. Patient/Parent/Caregiver agree with treatment plan and have no further questions at this time.      This note was created by the Dragon Voice Dictation System. Inadvertent typographical errors, due to software recognition problems, may still exist.             New Prescriptions    No medications on file       Final diagnoses:   Hallucinations       7/14/2024   [facility]       Adalberto Workman APRN CNP  07/14/24 2010

## 2024-07-14 NOTE — ED TRIAGE NOTES
"Patient presents to ED escorted by law enforcement () for psychiatric evaluation. Per law enforcement patient \"not under arrest but officer wants to have a  form to sign to put a hold on patient\". Per  \"Patient was picked up at his house displaying extreme paranoia that has been escalating the past two months after patient loss of employment, patient was in his yard with a gun shooting off shots not toward people, patient cooperative with EL, and last meth use a few hours ago. No hx of violence\".        "

## 2024-07-15 PROCEDURE — 250N000013 HC RX MED GY IP 250 OP 250 PS 637: Performed by: NURSE PRACTITIONER

## 2024-07-15 PROCEDURE — 124N000001 HC R&B MH

## 2024-07-15 PROCEDURE — 99223 1ST HOSP IP/OBS HIGH 75: CPT | Mod: AI | Performed by: PSYCHIATRY & NEUROLOGY

## 2024-07-15 RX ORDER — LANOLIN ALCOHOL/MO/W.PET/CERES
3 CREAM (GRAM) TOPICAL
Status: DISCONTINUED | OUTPATIENT
Start: 2024-07-15 | End: 2024-07-17 | Stop reason: HOSPADM

## 2024-07-15 RX ORDER — HYDROXYZINE HYDROCHLORIDE 25 MG/1
25 TABLET, FILM COATED ORAL EVERY 4 HOURS PRN
Status: DISCONTINUED | OUTPATIENT
Start: 2024-07-15 | End: 2024-07-17 | Stop reason: HOSPADM

## 2024-07-15 RX ORDER — OLANZAPINE 10 MG/1
10 TABLET ORAL 3 TIMES DAILY PRN
Status: DISCONTINUED | OUTPATIENT
Start: 2024-07-15 | End: 2024-07-17 | Stop reason: HOSPADM

## 2024-07-15 RX ORDER — ACETAMINOPHEN 325 MG/1
650 TABLET ORAL EVERY 4 HOURS PRN
Status: DISCONTINUED | OUTPATIENT
Start: 2024-07-15 | End: 2024-07-17 | Stop reason: HOSPADM

## 2024-07-15 RX ORDER — MAGNESIUM HYDROXIDE/ALUMINUM HYDROXICE/SIMETHICONE 120; 1200; 1200 MG/30ML; MG/30ML; MG/30ML
30 SUSPENSION ORAL EVERY 4 HOURS PRN
Status: DISCONTINUED | OUTPATIENT
Start: 2024-07-15 | End: 2024-07-17 | Stop reason: HOSPADM

## 2024-07-15 RX ORDER — OLANZAPINE 10 MG/2ML
10 INJECTION, POWDER, FOR SOLUTION INTRAMUSCULAR 3 TIMES DAILY PRN
Status: DISCONTINUED | OUTPATIENT
Start: 2024-07-15 | End: 2024-07-17 | Stop reason: HOSPADM

## 2024-07-15 RX ADMIN — OLANZAPINE 10 MG: 10 TABLET, FILM COATED ORAL at 10:59

## 2024-07-15 RX ADMIN — OLANZAPINE 10 MG: 10 TABLET, FILM COATED ORAL at 01:34

## 2024-07-15 ASSESSMENT — ACTIVITIES OF DAILY LIVING (ADL)
ADLS_ACUITY_SCORE: 28
ADLS_ACUITY_SCORE: 45
ADLS_ACUITY_SCORE: 28
ADLS_ACUITY_SCORE: 45
ADLS_ACUITY_SCORE: 28

## 2024-07-15 NOTE — PLAN OF CARE
Bert SARAH Naidu  July 14, 2024  Plan of Care Hand-off Note     Patient Care Path: inpatient mental health    Plan for Care:   It is the recommendation of this clinician that pt admit to IP MH for safety and stabilization. Pt displays the following risk factors that support IP admission: Pt presents for paranoid delusions of people following him and getting into his home. Pt denies SI/SIB/SA/HI and denies plans, means, or intent to harm himself or others. Pt delusions appear to be the result of methamphetamine use, per chart review, and considereing pt used meth today. Pt appears to lack insight into his mental health severity and has not followed through with previous recommendations. Pt is unable to engage in safety planning to mitigate risk level in a non-secure setting. Lower levels of care are not sufficient. Due to this IP is the least restrictive option of care for pt. Pt should remain in IP until deemed safe to return to the community and engage in OP  supports. Pt would benefit from outpatient medication management and therapy, PEGGY assessment, and social work consult.    Identified Goals and Safety Issues: stabilize mental health, social work consult, PEGGY assessment    Overview:  none            Legal Status: Legal Status at Admission: Voluntary/Patient has signed consent for treatment    Psychiatry Consult: no       Updated   regarding plan of care.           LIBERTAD Rosales

## 2024-07-15 NOTE — PLAN OF CARE
"Social Service Psychosocial Assessment    Presenting Problem: According to [facility]: Pt presents to ED for paranoia and shooting a gun outside of his home. Pt reports he was target shooting and was doing this in the yard behind his camper. Pt reports that he heard someone in his camper and called 911. Pt reports this happens frequently, but he is never allowed to check if someone is in the camper before police bring him home. Pt reports that he believes he has been stalked for 4 years and reports he has been in the ED previously for this.     According to pt: \" I was doing some stuff I shouldn't have been doing I guess.\" Pt did confirm that he was shooting a gun in his yard.     Marital Status: Single     Spouse / Children: Has children    Psychiatric [place] HX: Denies IP hospitalizations for MH.     Suicide Risk Assessment: Hx of SI/SIB/HI. Not admitted for SI/SIB/HI. Denies SI at time of assessment.     Access to Lethal Means (explain): Has a gun. Sw called police to see if all guns were secured at time of the incident.  Voicemail left for someone to call back.     Family Psych HX: No known       A & Ox: x4      Medication Adherence: See H&P    Medical Issues: See H&P      Visual -Motor Functioning: Good    Communication Skills /Needs: Good    Ethnicity: White      Spirituality/Quaker Affiliation: None     Clergy Request: No      History: None reported      Living Situation:  Pt reports he lives alone and has an ex staying with him at times.      ADL s: Independent       Education: Unknown     Financial Situation: None, is working with [facility] to get assistance though.     Occupation: Unemployed      Leisure & Recreation: Home improvement     Childhood History: Unknown      Trauma Abuse HX: Denies.     Relationship / Sexuality: No/ Heterosexual     Substance Use/ Abuse: Uses Methamphetamine, last used 07/14/24.  Hx of AUD last used this past week.     Chemical Dependency Treatment HX:  Residential treatment for " alcohol once.     Legal Issues: Denies     Significant Life Events: Current drug use. First hospitalization.     Strengths: Ability to communicate needs, in a safe environment.    Challenges /Limitation: Poor coping skills, current mental health symptoms,  unable to afford rent/mortgage, lack of insight into MH.     Patient Support Contact (Include name, relationship, number, and summary of conversation):      Interventions:         Community-Based Programs- Would benefit     Medical/Dental Care- None Listed     CD Evaluation/Rule 25/Aftercare- Would benefit    Medication Management-Would benefit     Individual Therapy- Would benefit     Insurance Coverage- BCBS/BCBS OUT OF STATE     Suicide Risk Assessment- Hx of SI/SIB/HI. Not admitted for SI/SIB/HI. Denies SI at time of assessment.     High Risk Safety Plan- Talk to supports; Call crisis lines; Go to local ER if feeling suicidal.    MARY Garcia  7/15/2024  11:52 AM

## 2024-07-15 NOTE — PROGRESS NOTES
07/15/24 0230   Patient Belongings   Did you bring any home meds/supplements to the hospital?  No   Patient Belongings locker;sent to security per site process;returned to patient at discharge   Patient Belongings Put in Hospital Secure Location (Security or Locker, etc.) cell phone/electronics;keys   Belongings Search Yes   Clothing Search Yes   Second Staff Cassidy   Comment Brown pants, , black socks, crocs     List items sent to safe: black phone with screen protector, no visible scratches or cracks, key chain wit 2 keys    All other belongings put in assigned cubby in belongings room.     I have reviewed my belongings list on admission and verify that it is correct.     Patient signature_______________________________    Second staff witness (if patient unable to sign) ______________________________       I have received all my belongings at discharge.    Patient signature________________________________    Marimar   7/15/2024  2:32 AM

## 2024-07-15 NOTE — PLAN OF CARE
"  Problem: Adult Behavioral Health Plan of Care  Goal: Patient-Specific Goal (Individualization)  Description: Pt will follow recommendations of treatment team.  Pt will be compliant with medications.  Pt will attend 50 % of groups.  Pt will sleep 6-8 hours each night.  7/15- no wean at this time, provider and treatment team to assess.  Outcome: Progressing     Problem: Thought Process Alteration  Goal: Optimal Thought Clarity  Description: Pt will have a decrease in hallucinations.  Pt will have a logical and linear conversation.  Outcome: Progressing     Face to face shift report received from Renny BACH. Rounding completed, pt observed.     ADMISSION NOTE    Reason for admission psychosis.  Safety concerns paranoid and delusional.  Risk for or history of violence none noted- was shooting guns in his yard (said target practicing).  SKin- multiple bruises and scratches by co RN that completed the admission.     Patient arrived on unit from ED accompanied by staff and security on 7/15/2024  00:41 AM.   Status on arrival: cooperative  /96   Pulse 71   Temp 97.6  F (36.4  C) (Temporal)   Resp 16   Ht 1.803 m (5' 11\")   Wt 78.2 kg (172 lb 4.8 oz)   SpO2 99%   BMI 24.03 kg/m    Patient given tour of unit and Welcome to  unit papers given to patient, wanding completed, belongings inventoried, and admission assessment started .   Patient's legal status on arrival is voluntary. Appropriate legal rights discussed with and copy given to patient. Patient Bill of Rights discussed with and copy given to patient.   Patient denies SI, HI, and thoughts of self harm and contracts for safety while on unit.      Admission was completed by another RN. Writer took over care of pt due to staff emergency. Pt had been given a Zyprexa 10 mg po at 0134 by that RN due to pt a bit agitated and high on Meth.    Pt appeared to be sleeping after 0200 rounds.    Face to face report will be communicated to oncoming RN.    Hilda " MIKALA Tovar  7/15/2024  6:31 AM

## 2024-07-15 NOTE — ED NOTES
IP MH Referral Acuity Rating Score (RARS)    LMHP complete at referral to IP MH, with DEC; and, daily while awaiting IP MH placement. Call score to PPS.  CRITERIA SCORING   New 70 HH and Involuntary for IP MH (not adolescent) 0/1   Boarding over 24 hours 0/1   Vulnerable adult at least 55+ with multiple co morbidities; or, Patient age 11 or under 0/1   Suicide ideation without relief of precipitating factors 0/1   Current plan for suicide 0/1   Current plan for homicide 0/1   Imminent risk or actual attempt to seriously harm another without relief of factors precipitating the attempt 1/1   Severe dysfunction in daily living (ex: complete neglect for self care, extreme disruption in vegetative function, extreme deterioration in social interactions) 1/1   Recent (last 2 weeks) or current physical aggression in the ED 0/1   Restraints or seclusion episode in ED 0/1   Verbal aggression, agitation, yelling, etc., while in the ED 0/1   Active psychosis with psychomotor agitation or catatonia 0/1   Need for constant or near constant redirection (from leaving, from others, etc).  0/1   Intrusive or disruptive behaviors 1/1   TOTAL Acuity Total Score: 3

## 2024-07-15 NOTE — H&P
"  [facility] PSYCHIATRY  -  HISTORY AND PHYSICAL     ADMISSION DATA     Bert Naidu MRN# [id number]   Age: 48 year old YOB: 1975     Date of Admission: 7/14/2024  Primary Physician: No Ref-Primary, Physician   Referral Area: Referral Area: [facility]       CHIEF COMPLAINT   Reason for Admit/Consult: Deterioration of functioning, Psychosis / nikki symptoms, and Substance use       HISTORY OF PRESENT ILLNESS   Bert Naidu is a 48 year old male with a past psychiatric history notable for depression, anxiety, and polysubstance abuse.  Presents to emergency department by way of law enforcement after being disorganized in his yard, reportedly shooting off a firearm, intoxicated on methamphetamine. Patient was subsequently transferred and accepted for inpatient psychiatric hospitalization at [facility] for further safety and further stabilization     Prior to seeing the patient, I had the opportunity to review the medical record. Per ED, \"Bert Naidu is a 48 year old individual with history of ketamine induced psychotic disorder comes in via law enforcement for psychiatric evaluation.Patient apparently has been seeing and hearing things.  Apparently patient was taking a pistol and shooting in his yard.  While enforcement was called.  Patient did come in but was not pleased about it. Patient does admit to smoking methamphetamine.  Denies hallucinations.  Denies any suicidal or homicidal ideation.  Denies any alcohol use.\" Per DEC, \"Bert Naidu presents to the ED via police. Patient is presenting to the ED for the following concerns: Paranoia, Worsening psychosocial stress.   Factors that make the mental health crisis life threatening or complex are:  Pt presents to ED for paranoia and shooting a gun outside of his home. Pt reports he was target shooting and was doing this in the yard behind his camper. Pt reports that he heard someone " "in his camper and called 911. Pt reports this happens frequently, but he is never allowed to check if someone is in the camper before police bring him home. Pt reports that he believes he has been stalked for 4 years and reports he has been in the ED previously for this. Pt reports, that although he does methamphetamine, these fears have maintained when sober. Pt reports that he has images and videos of cars following him and feels misunderstood by law enforcement. Pt denies hallucinations. Pt denies SI/SIB/SA/HI and denies plans, means, or intent to harm himself or others. Pt appears to be experiencing paranoid deluions of people following him and appears to lack insight into this as a mental health issue.. \"    On psychiatric interview, Bert is met within his room within ICU. He is responding to internal stimuli. He acknowledges that he has been abusing methamphetamine.  He states he has been having complications with people \"following\" him as well as difficulties with his significant other. He states that \"they\" got me fired.  He is concerned that people are out to harm him.  He is aware he is in the hospital. He can't provide additional information about his mental health history. He reports his mood has been down. He is not sure if olanzapine helped last evening. He agrees to take PRN and again this evening.        REVIEW OF PSYCHIATRIC SYSTEMS   I do not elicit symptoms consistent with generalized anxiety, agoraphobia, social anxiety, panic disorder, OCD, an eating disorder, and pain     REVIEW OF MEDICAL SYSTEMS   14-point medical review of systems is negative other than noted in the HPI.     PSYCHIATRIC HISTORY   Pt reports history of methamphetamine abuse and has been to residential treatment for alcohol once. Pt denies historical nor current mental health, including: inpatient stays, programmatic care, treatment, therapy, or medication management. Pt denies history of SI/SIB/SA/HI and denies plans, " "means, or intent to harm himself or others. Pt denies historical hallucinations or delusions, however, per his chart review, pt has a history of paranoia and believign people are out to get him.      FAMILY HISTORY   No family history on file.      SUBSTANCE USE HISTORY   History   Drug Use Unknown     Comment: within the last week       Social History    Substance and Sexual Activity      Alcohol use: Yes        Comment: occasional      History   Smoking Status     Every Day     Types: Vaping Device   Smokeless Tobacco     Former     Types: Chew     Chemical Use History:  Alcohol: Other (comments) (\"When I have it\")  Last Use::  (\"past week\")  Benzodiazepines: None  Opiates: None  Cocaine: None  Marijuana: None  Other Use: Methamphetamines  Last Use:: 07/14/24  Addictions:  (Denies)   Past diagnosis:  Substance Use Disorder       SOCIAL HISTORY   Social History     Socioeconomic History     Marital status: Single     Spouse name: Not on file     Number of children: Not on file     Years of education: Not on file     Highest education level: Not on file   Occupational History     Not on file   Tobacco Use     Smoking status: Every Day     Types: Vaping Device     Smokeless tobacco: Former     Types: Chew   Substance and Sexual Activity     Alcohol use: Yes     Comment: occasional     Drug use: Not Currently     Types: Methamphetamines, Marijuana     Comment: within the last week     Sexual activity: Not on file   Other Topics Concern     Not on file   Social History Narrative     Not on file     Social Determinants of Health     Financial Resource Strain: Not on file   Food Insecurity: Not on file   Transportation Needs: Not on file   Physical Activity: Not on file   Stress: Not on file   Social Connections: Not on file   Interpersonal Safety: Not on file   Housing Stability: Not on file     Family:  Single, Children yes  Support System:  None  Employment Status:  unemployed  Source of Income:  none  Financial " "Environmental Concerns:  unable to afford rent/mortgage, unable to afford medication(s), unable to afford food, unemployed  Current Hobbies:  home improvement  Barriers in Personal Life:  mental health concerns     Pt reports he lives alone and has ax ex staying with him at times. Pt reports no history of legal issues or  involvement.      PAST MEDICAL HISTORY   No past medical history on file.    No past surgical history on file.    Patient has no known allergies.     PHYSICAL EXAM     General: Awake and alert, NAD  HEENT: EOMI, no scleral icterus, no injection of conjunctivae, moist mucus membranes  Respiratory: Breathing comfortably   Extremities: No cyanosis, clubbing, or edema   Skin: No gross rash, no bruising  Neuro: CN II-XII intact, no focal deficits      VITALS   Vitals: /75   Pulse 64   Temp 97.3  F (36.3  C) (Temporal)   Resp 16   Ht 1.803 m (5' 11\")   Wt 78.2 kg (172 lb 4.8 oz)   SpO2 97%   BMI 24.03 kg/m       MENTAL STATUS EXAM   Appearance:  awake, alert, adequately groomed, dressed in hospital scrubs, and appeared as age stated  Attitude:  cooperative  Eye Contact:  good  Mood:  \"alright\"  Affect: flat   Speech:  clear, coherent  Psychomotor Behavior:  no evidence of tardive dyskinesia, dystonia, or tics  Thought Process:  logical, linear, and goal oriented  Associations:  no loose associations  Thought Content:  patient appears to be responding to internal stimuli  Insight:  limited  Judgment:  limited  Oriented to:  time, person, and place  Attention Span and Concentration:  intact  Recent and Remote Memory:  intact  Gait and Station: Normal      LABS   Recent Results (from the past 24 hour(s))   Comprehensive metabolic panel    Collection Time: 07/14/24  7:05 PM   Result Value Ref Range    Sodium 146 (H) 135 - 145 mmol/L    Potassium 3.7 3.4 - 5.3 mmol/L    Carbon Dioxide (CO2) 26 22 - 29 mmol/L    Anion Gap 9 7 - 15 mmol/L    Urea Nitrogen 12.7 6.0 - 20.0 mg/dL    Creatinine " 1.29 (H) 0.67 - 1.17 mg/dL    GFR Estimate 68 >60 mL/min/1.73m2    Calcium 8.9 8.6 - 10.0 mg/dL    Chloride 111 (H) 98 - 107 mmol/L    Glucose 131 (H) 70 - 99 mg/dL    Alkaline Phosphatase 81 40 - 150 U/L    AST 30 0 - 45 U/L    ALT 15 0 - 70 U/L    Protein Total 7.4 6.4 - 8.3 g/dL    Albumin 4.6 3.5 - 5.2 g/dL    Bilirubin Total 0.6 <=1.2 mg/dL   Ethyl Alcohol Level    Collection Time: 07/14/24  7:05 PM   Result Value Ref Range    Alcohol ethyl <0.01 <=0.01 g/dL   CBC with platelets and differential    Collection Time: 07/14/24  7:05 PM   Result Value Ref Range    WBC Count 4.9 4.0 - 11.0 10e3/uL    RBC Count 4.24 (L) 4.40 - 5.90 10e6/uL    Hemoglobin 13.3 13.3 - 17.7 g/dL    Hematocrit 40.0 40.0 - 53.0 %    MCV 94 78 - 100 fL    MCH 31.4 26.5 - 33.0 pg    MCHC 33.3 31.5 - 36.5 g/dL    RDW 13.4 10.0 - 15.0 %    Platelet Count 230 150 - 450 10e3/uL    % Neutrophils 67 %    % Lymphocytes 19 %    % Monocytes 11 %    % Eosinophils 2 %    % Basophils 1 %    % Immature Granulocytes 0 %    NRBCs per 100 WBC 0 <1 /100    Absolute Neutrophils 3.3 1.6 - 8.3 10e3/uL    Absolute Lymphocytes 0.9 0.8 - 5.3 10e3/uL    Absolute Monocytes 0.6 0.0 - 1.3 10e3/uL    Absolute Eosinophils 0.1 0.0 - 0.7 10e3/uL    Absolute Basophils 0.0 0.0 - 0.2 10e3/uL    Absolute Immature Granulocytes 0.0 <=0.4 10e3/uL    Absolute NRBCs 0.0 10e3/uL   Extra Blue Top Tube    Collection Time: 07/14/24  7:05 PM   Result Value Ref Range    Hold Specimen JIC    Extra Red Top Tube    Collection Time: 07/14/24  7:05 PM   Result Value Ref Range    Hold Specimen JIC    Extra Heparinized Syringe    Collection Time: 07/14/24  7:05 PM   Result Value Ref Range    Hold Specimen Ok    UA with Microscopic reflex to Culture    Collection Time: 07/14/24  7:47 PM    Specimen: Urine, NOS   Result Value Ref Range    Color Urine Yellow Colorless, Straw, Light Yellow, Yellow    Appearance Urine Clear Clear    Glucose Urine 30 (A) Negative mg/dL    Bilirubin Urine Negative  Negative    Ketones Urine Negative Negative mg/dL    Specific Gravity Urine 1.030 1.003 - 1.035    Blood Urine Negative Negative    pH Urine 5.5 4.7 - 8.0    Protein Albumin Urine 30 (A) Negative mg/dL    Urobilinogen Urine Normal Normal, 2.0 mg/dL    Nitrite Urine Negative Negative    Leukocyte Esterase Urine Small (A) Negative    Mucus Urine Present (A) None Seen /LPF    RBC Urine <1 <=2 /HPF    WBC Urine 6 (H) <=5 /HPF    Squamous Epithelials Urine 0 <=1 /HPF   Urine Drug Screen Panel    Collection Time: 07/14/24  7:47 PM   Result Value Ref Range    Amphetamines Urine Screen Positive (A) Screen Negative    Barbituates Urine Screen Negative Screen Negative    Benzodiazepine Urine Screen Negative Screen Negative    Cannabinoids Urine Screen Negative Screen Negative    Cocaine Urine Screen Negative Screen Negative    Fentanyl Qual Urine Screen Negative Screen Negative    Opiates Urine Screen Negative Screen Negative    PCP Urine Screen Negative Screen Negative         ASSESSMENT   Bert Naidu is a 48 year old male with a past psychiatric history notable for depression, anxiety, and polysubstance abuse.  Presents to emergency department by way of law enforcement after being disorganized in his yard, reportedly shooting off a firearm, intoxicated on methamphetamine. Patient was subsequently transferred and accepted for inpatient psychiatric hospitalization at [facility] for further safety and further stabilization     Explained the side effects, benefits and complications of neuroleptic medications. Retains capacity to consent. Consents to initiation during hospitalization.     Start olanzapine 10 mg at bedtime        DIAGNOSTIC FORMULATION   #Affective Psychosis  #Stimulant (Methamphetamine) Use Disorder, Severe     PLAN   Admit patient to [facility], Location: [facility]     Legal Status: Orders Placed This Encounter      Voluntary    Safety Assessment:     Behavioral Orders   Procedures     Code 1 - Restrict to Unit     [facility] Extended Care     Until discharge, Extended Care to offer psychotherapeutic services to mental health patients boarding for admission or stabilization. These services are to include but are not limited to: individual psychotherapy, diagnostic assessment, case management and care planning, safety planning, etc. This may include up to 1 visit per day. If patient is physically located at [facility] or [facility], group psychotherapy up to 2 time per day may be offered.      Routine Programming     As clinically indicated     Status 15     Every 15 minutes.      Imminent risk of harm in a setting less restrictive than an inpatient psychiatric facility is determined to be medium to high.       PTA medications held:   -none    PTA medications continued/changed:   -none    New medications initiated:   -olanzapine 10 mg at bedtime , PRN available    Programming: Patient will be treated in a therapeutic milieu with appropriate individual and group therapies. Education will be provided on diagnoses, medications, and treatments.     Medical diagnoses:  Per medicine    Diagnostic studies and consultations:  No additional studies or tests recommended on admission.     Level of care required: Acute psychiatric hospitalization    Justification for hospitalization and estimated length of stay: reasons for hospitalization include potential safety risk to self or others within the last week, decreased functioning in outpatient setting and in the setting of no outpatient management, need for highly structured inpatient management for stabilization of psychiatric symptoms, need for psychiatric medication initiation and stabilization.  Estimated length of stay is 4-7 days.      Total time: 80 minutes       ATTESTATION    Yahir Bauer MD  [facility]  Type of Service: video visit for mental health treatment  Reason for Video Visit: COVID-19 [place]  Originating Site (patient location): [facility]  Distant Site (provider location): Remote Location  Mode of Communication: Video Conference via [employer]  Time of Service: Date: July 15, 2024 , Start: 07:00,  Stop: 08:00

## 2024-07-15 NOTE — PLAN OF CARE
Problem: Adult Behavioral Health Plan of Care  Goal: Patient-Specific Goal (Individualization)  Description: Pt will follow recommendations of treatment team.  Pt will be compliant with medications.  Pt will attend 50 % of groups.  Pt will sleep 6-8 hours each night.  7/15- no wean at this time, provider and treatment team to assess.  Outcome: Progressing     Problem: Thought Process Alteration  Goal: Optimal Thought Clarity  Description: Pt will have a decrease in hallucinations.  Pt will have a logical and linear conversation.  Outcome: Progressing   Goal Outcome Evaluation:    Pt sleeping in bed at the start of the shift. Pt did get up for breakfast and vitals. Pt denies, pain, depression, SI, HI and hallucinations. Pt does report anxiety and fatigue. Pt given zyprexa 10mg at 1059.          Face to face end of shift report communicated to evening shift RN.     Deyanira Correa, RN  7/15/2024  7:34 AM

## 2024-07-15 NOTE — CONSULTS
Diagnostic Evaluation Consultation  Crisis Assessment    Patient Name: Bert Naidu  Age:  48 year old  Legal Sex: male  Gender Identity: male  Pronouns:   Race: White  Ethnicity: Not  or   Language: English      Patient was assessed: Virtual: ESCAPESwithYOU   Crisis Assessment Start Date: 07/14/24  Crisis Assessment Start Time: 1919  Crisis Assessment Stop Time: 1934  Patient location: [facility] EM[facility]GENCY DEPARTMENT                             ED08    Referral Data and Chief Complaint  Bert Naidu presents to the ED via police. Patient is presenting to the ED for the following concerns: Paranoia, Worsening psychosocial stress.   Factors that make the mental health crisis life threatening or complex are:  Pt presents to ED for paranoia and shooting a gun outside of his home. Pt reports he was target shooting and was doing this in the yard behind his camper. Pt reports that he heard someone in his camper and called 911. Pt reports this happens frequently, but he is never allowed to check if someone is in the camper before police bring him home. Pt reports that he believes he has been stalked for 4 years and reports he has been in the ED previously for this. Pt reports, that although he does methamphetamine, these fears have maintained when sober. Pt reports that he has images and videos of cars following him and feels misunderstood by law enforcement. Pt denies hallucinations. Pt denies SI/SIB/SA/HI and denies plans, means, or intent to harm himself or others. Pt appears to be experiencing paranoid deluions of people following him and appears to lack insight into this as a mental health issue..      Informed Consent and Assessment Methods  Explained the crisis assessment process, including applicable information disclosures and limits to confidentiality, assessed understanding of the process, and obtained consent to proceed with the assessment.  Assessment methods included conducting a formal interview with  "patient, review of medical records, collaboration with medical staff, and obtaining relevant collateral information from family and community providers when available.  : done     Patient response to interventions: verbalizes understanding  Coping skills were attempted to reduce the crisis:  none     History of the Crisis   Pt reports history of methamphetamine abuse and has been to residential treatment for alcohol once. Pt denies historical nor current mental health, including:  inpatient stays, programmatic care, treatment, therapy, or medication management. Pt denies history of SI/SIB/SA/HI and denies plans, means, or intent to harm himself or others. Pt denies historical hallucinations or delusions, however, per his chart review, pt has a history of paranoia and believign people are out to get him.    Brief Psychosocial History  Family:  Single, Children yes  Support System:  None  Employment Status:  unemployed  Source of Income:  none  Financial Environmental Concerns:  unable to afford rent/mortgage, unable to afford medication(s), unable to afford food, unemployed  Current Hobbies:  home improvement  Barriers in Personal Life:  mental health concerns    Significant Clinical History  Current Anxiety Symptoms:  anxious, racing thoughts  Current Depression/Trauma:  crying or feels like crying, helplessness, hopelessness, impaired decision making  Current Somatic Symptoms:     Current Psychosis/Thought Disturbance:  impulsive, high risk behavior  Current Eating Symptoms:     Chemical Use History:  Alcohol: Other (comments) (\"When I have it\")  Last Use::  (\"past week\")  Benzodiazepines: None  Opiates: None  Cocaine: None  Marijuana: None  Other Use: Methamphetamines  Last Use:: 07/14/24  Addictions:  (Denies)   Past diagnosis:  Substance Use Disorder  Family history:  No known history of mental health or chemical health concerns  Past treatment:  Residential Treatment  Details of most recent treatment:  Pt " denies current services.  Other relevant history:  Pt reports he lives alone and has ax ex staying with him at times. Pt reports no history of legal issues or  involvement.       Collateral Information  Is there collateral information: No (pt reports no contact with Emergency contact and does not have his phone for additional collateral options)     Collateral information name, relationship, phone number:       What happened today:       What is different about patient's functioning:       Concern about alcohol/drug use:      What do you think the patient needs:      Has patient made comments about wanting to kill themselves/others:      If d/c is recommended, can they take part in safety/aftercare planning:       Additional collateral information:        Risk Assessment  [place] Suicide Severity Rating Scale Full Clinical Version:  Suicidal Ideation  Q1 Wish to be Dead (Lifetime): No  Q2 Non-Specific Active Suicidal Thoughts (Lifetime): No  Q6 Suicide Behavior (Lifetime): no     Suicidal Behavior (Lifetime)  Actual Attempt (Lifetime): No  Has subject engaged in non-suicidal self-injurious behavior? (Lifetime): No  Interrupted Attempts (Lifetime): No  Aborted or Self-Interrupted Attempt (Lifetime): No  Preparatory Acts or Behavior (Lifetime): No    [place] Suicide Severity Rating Scale Recent:   Suicidal Ideation (Recent)  Q1 Wished to be Dead (Past Month): no  Q2 Suicidal Thoughts (Past Month): no  Level of Risk per Screen: no risks indicated          Environmental or Psychosocial Events: ongoing abuse of substances, challenging interpersonal relationships, impulsivity/recklessness  Protective Factors: Protective Factors: lives in a responsibly safe and stable environment, help seeking, good impulse control    Does the patient have thoughts of harming others? Feels Like Hurting Others: no  Previous Attempt to Hurt Others: no  Is the patient engaging in sexually inappropriate behavior?: no    Is the  patient engaging in sexually inappropriate behavior?  no        Mental Status Exam   Affect: Appropriate  Appearance: Disheveled  Attention Span/Concentration: Attentive  Eye Contact: Engaged    Fund of Knowledge: Appropriate   Language /Speech Content: Fluent  Language /Speech Volume: Normal  Language /Speech Rate/Productions: Normal  Recent Memory: Intact  Remote Memory: Intact  Mood: Anxious  Orientation to Person: Yes   Orientation to Place: Yes  Orientation to Time of Day: Yes  Orientation to Date: Yes     Situation (Do they understand why they are here?): Yes  Psychomotor Behavior: Normal  Thought Content: Delusions, Paranoia  Thought Form: Intact     Mini-Cog Assessment  Number of Words Recalled:    Clock-Drawing Test:     Three Item Recall:    Mini-Cog Total Score:       Medication  Psychotropic medications:   Medication Orders - Psychiatric (From admission, onward)      None             Current Care Team  Patient Care Team:  No Ref-Primary, Physician as PCP - General    Diagnosis  Patient Active Problem List   Diagnosis Code    Amphetamine-induced psychotic disorder, 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 Pt displays the following risk factors that support IP admission: Pt presents for paranoid delusions of people following him and getting into his home. Pt denies SI/SIB/SA/HI and denies plans, means, or intent to harm himself or others. Pt delusions appear to be the result of methamphetamine use, per chart review, and considereing pt used meth today. Pt appears to lack insight into his mental health severity and has not followed through with previous  recommendations. Pt is unable to engage in safety planning to mitigate risk level in a non-secure setting. Lower levels of care are not sufficient. Due to this IP is the least restrictive option of care for pt. Pt should remain in IP until deemed safe to return to the community and engage in [facility] supports. Pt would benefit from outpatient medication management and therapy, PEGGY assessment, and social work consult.       Imminent risk of harm:  (paranoid delusions)  Severe psychiatric, behavioral or other comorbid conditions are appropriate for management at inpatient mental health as indicated by at least one of the following: Impaired impulse control, judgement, or insight, Psychiatric Symptoms  Severe dysfunction in daily living is present as indicated by at least one of the following: Extreme deterioration in social interactions, Other evidence of severe dysfunction  Situation and expectations are appropriate for inpatient care: Biopsychosocial stresses potentially contributing to clinical presentation (co morbidities) have been assessed and are absent or manageable at proposed level of care  Inpatient mental health services are necessary to meet patient needs and at least one of the following: Specific condition related to admission diagnosis is present and judged likely to further improve at proposed level of care, Specific condition related to admission diagnosis is present and judged likely to deteriorate in absence of treatment at proposed level of care      Patient coping skills attempted to reduce the crisis:  none    Disposition  Recommended disposition: Observation        Reviewed case and recommendations with attending provider. Attending Name: GAIL Adair       Attending concurs with disposition: no (would prefer pt go inpatient)       Patient and/or validated legal guardian concurs with disposition:   yes       Final disposition:  inpatient mental health    Legal status on admission:  Voluntary/Patient has signed consent for treatment    Assessment Details   Total duration spent with the patient: 15 min     CPT code(s) utilized: Non-Billable    LIBERTAD Rosales, Psychotherapist  [facility] - Triage & Transition Services  Callback: [phone]

## 2024-07-15 NOTE — MEDICATION SCRIBE - ADMISSION MEDICATION HISTORY
Medication Scribe Admission Medication History    Admission medication history is complete. The information provided in this note is only as accurate as the sources available at the time of the update.    Information Source(s): Patient and CareEverywhere/SureScripts via in-person    Pertinent Information:   Patient denies taking any prescribed medications, nor is supposed to be taking any prescribed medications, at this time (scheduled and/or PRN).     Patient reports the following OTC products: MTV gummie    No conflicting data from any available outside sources.     Changes made to PTA medication list:  Added: MTV  Deleted: None  Changed: None    Allergies reviewed with patient and updates made in EHR: yes    Medication History Completed By: Bhumika Quintero 7/15/2024 10:57 AM    PTA Med List   Medication Sig Last Dose    Multiple Vitamins-Minerals (MULTIVITAMIN GUMMIES ADULT PO) Take 1 Dose by mouth daily

## 2024-07-15 NOTE — ED NOTES
Face to face report given with opportunity to observe patient.    Report given to MIKALA Denis RN   7/14/2024  7:23 PM

## 2024-07-15 NOTE — TELEPHONE ENCOUNTER
S: [facility] , [facility]  [place]  calling at 11:47PM about a 48 year old/Male presenting with paranoid delusions and meth use     B: Pt arrived via Police. Presenting problem, stressors: Pt thinks people are stalking him and that cars are following him.     Pt affect in ED: Anxious  and Cooperative   Pt Dx: Substance Use Disorder: meth   Previous IPMH hx? No  Pt denies SI   Hx of suicide attempt? No  Pt denies SIB  Pt denies HI   Pt denies hallucinations .   Pt RARS Score: 3    Hx of aggression/violence, sexual offenses, legal concerns, Epic care plan? describe: None  Current concerns for aggression this visit? No  Does pt have a history of Civil Commitment? No  Is Pt their own guardian? Yes    Pt is not prescribed medication. Is patient medication compliant? N/A  Pt denies OP services   CD concerns: Actively using/consuming meth  Acute or chronic medical concerns: None  Does Pt present with specific needs, assistive devices, or exclusionary criteria? None      Pt is ambulatory  Pt is able to perform ADLs independently      A: Pt to be reviewed for [facility] admission. Pt is Voluntary  Preferred placement: [facility]    COVID Symptoms: No  If yes, COVID test required   Utox: Positive for Amphetamines     CMP: Abnormalities: Sodium 146 / Creatinine 1.29 / Chloride 111 / Glucose 131  CBC: Abnormalities: RBC 4.24  HCG: N/A    R: Patient cleared and ready for behavioral bed placement: Yes  Pt placed on IP worklist? Yes    Does Patient need a Transfer Center request created? Yes, writer completed Transfer Center request at:     R: [address] [place] [address] / [place]    [place]

## 2024-07-16 PROCEDURE — 99232 SBSQ HOSP IP/OBS MODERATE 35: CPT | Mod: 95 | Performed by: PSYCHIATRY & NEUROLOGY

## 2024-07-16 PROCEDURE — 250N000013 HC RX MED GY IP 250 OP 250 PS 637: Performed by: NURSE PRACTITIONER

## 2024-07-16 PROCEDURE — 124N000001 HC R&B MH

## 2024-07-16 RX ADMIN — OLANZAPINE 10 MG: 10 TABLET, FILM COATED ORAL at 20:29

## 2024-07-16 ASSESSMENT — ACTIVITIES OF DAILY LIVING (ADL)
ORAL_HYGIENE: INDEPENDENT
ADLS_ACUITY_SCORE: 28
DRESS: INDEPENDENT
ADLS_ACUITY_SCORE: 28
HYGIENE/GROOMING: INDEPENDENT

## 2024-07-16 NOTE — PLAN OF CARE
Problem: Adult Behavioral Health Plan of Care  Goal: Patient-Specific Goal (Individualization)  Description: Pt will follow recommendations of treatment team.  Pt will be compliant with medications.  Pt will attend 50 % of groups.  Pt will sleep 6-8 hours each night.  7/15- no wean at this time, provider and treatment team to assess.  Outcome: Progressing   Goal Outcome Evaluation:        Face to face shift report received from RN. Rounding completed, pt observed.  Client rested in room for 7 hours with eyes closed and respirations noted.Face to face report will be communicated to oncoming RN.    Josemanuel Muir RN  7/16/2024  6:20 AM

## 2024-07-16 NOTE — PLAN OF CARE
Face to face shift report received from MIKALA Sutherland.       Problem: Adult Behavioral Health Plan of Care  Goal: Patient-Specific Goal (Individualization)  Description: Pt will follow recommendations of treatment team.  Pt will be compliant with medications.  Pt will attend 50 % of groups.  Pt will sleep 6-8 hours each night.  7/16.24- Pt may  wean if behavior remains appropriate. Provider and treatment team to assess daily  Outcome: Progressing   Room in MHICU. Weaned to open unit for majority of shift. Behaviors appropriate. Pleasant during conversation. Denies mental health issues. Pleasant during conversation. No reports of pain.   2030: Pt in room. Tearful, but does not offer why. Offered and accepted Zyprexa 10mg PO at this time.     Problem: Thought Process Alteration  Goal: Optimal Thought Clarity  Description: Pt will have a decrease in hallucinations.  Pt will have a logical and linear conversation.  Outcome: Progressing         Face to face end of shift report to be communicated to oncoming RN.

## 2024-07-16 NOTE — PROGRESS NOTES
[facility] submitted referrals to [facility], and [facility].

## 2024-07-16 NOTE — PLAN OF CARE
Face to face shift report received from Josemanuel CASTRO RN. Rounding completed, pt observed.    Problem: Adult Behavioral Health Plan of Care  Goal: Patient-Specific Goal (Individualization)  Description: Pt will follow recommendations of treatment team.  Pt will be compliant with medications.  Pt will attend 50 % of groups.  Pt will sleep 6-8 hours each night.  7/16.24- Pt may  wean if behavior remains appropriate. Provider and treatment team to assess daily  Outcome: Progressing  Note: Shift Summary:  Patient remains in a room in the MHICU r/t the need for decreased stimulation.  Patient is cooperative with nurse.  Oriented to person and place.  Mood is calm.  Allowed to wean per treatment team recommendation and appropriate behavior. He showered this shift and can make his needs appropriately known to staff.  Gait is balanced and steady.Weaning and maintaining appropriate behavior.     Problem: Thought Process Alteration  Goal: Optimal Thought Clarity  Description: Pt will have a decrease in hallucinations.  Pt will have a logical and linear conversation.  Outcome: Progressing  Face to face report will be communicated to oncoming RN.    Ena Leong RN  7/16/2024

## 2024-07-16 NOTE — PROGRESS NOTES
Patient approached [facility] and stated firearm has been secured. Informed SW verbally and provider via chat of patient statement.

## 2024-07-16 NOTE — DISCHARGE INSTRUCTIONS
Behavioral Discharge Planning and Instructions    Summary: Presents to emergency department by way of law enforcement after being disorganized in his yard, reportedly shooting off a firearm, intoxicated on methamphetamine.     Main Diagnosis: Affective Psychosis  Stimulant (Methamphetamine) Use Disorder, Severe    Health Care Follow-up: Presents to emergency department by way of law enforcement after being disorganized in his yard, reportedly shooting off a firearm, intoxicated on methamphetamine.     [facility]   July 30 @ 3:30pm Arrive at 3:15PM.   [address] [place] [id number]  [phone]    Attend all scheduled appointments with your outpatient providers. Call at least 24 hours in advance if you need to reschedule an appointment to ensure continued access to your outpatient providers.     CD Referrals: Referrals were sent to the following chemical dependency treatment programs. Please follow up directly with these programs about possible admission.     [facility] [phone]  [facility] [phone]  [facility] [phone]  [facility] [phone]    Major Treatments, Procedures and Findings:  You were provided with: a psychiatric assessment, assessed for medical stability, medication evaluation and/or management, group therapy, individual therapy, CD evaluation/assessment, milieu management, and medical interventions    Symptoms to Report: feeling more aggressive, increased confusion, losing more sleep, mood getting worse, or thoughts of suicide    Early warning signs can include: increased depression or anxiety sleep disturbances increased thoughts or behaviors of suicide or self-harm  increased unusual thinking, such as paranoia or hearing voices    Safety and Wellness:  Take all medicines as directed.  Make no changes unless your doctor suggests them.      Follow treatment recommendations.  Refrain from alcohol and  "non-prescribed drugs.  Ask your support system to help you reduce your access to items that could harm yourself or others. Items could include:  Firearms  Medicines (both prescribed and over-the-counter)  Knives and other sharp objects  Ropes and like materials  Car keys  If there is a concern for safety, call 911. If there is a concern for safety, call 911.    Resources:   Crisis Intervention: 883.625.9241 or 833-493-3919 (TTY: 287.618.2018).  Call anytime for help.  National [place] on Mental Illness (www.[place].viri.org): 299.734.4752 or 809-202-6480.  [place] Association for Children's Mental Health (www.mac.org): 215.994.3007.  Alcoholics Anonymous (www.alcoholics-anonymous.org): Check your phone book for your local chapter.  Suicide Awareness Voices of Education (SAVE) (www.save.org): 515-029-RKZB (5520)  National Suicide Prevention Line (www.mentalhealthmn.org): 735-627-XVJS (1472)  Mental Health Consumer/Survivor Network of [place] (www.mhcsn.net): 241.112.2966 or 381-409-7936  Mental Health Association of [place] (www.mentalhealth.org): 203.370.6857 or 290-985-7233  Self- Management and Recovery Training., SMART-- Toll free: 478.972.4139  www.[employer].Topguest  Text 4 Life: txt \"LIFE\" to 16785 for immediate support and crisis intervention  Crisis text line: Text \"[place]\" to 337093. Free, confidential, 24/7.  Crisis Intervention: 103.222.8070 or 928-821-7379. Call anytime for help.     General Medication Instructions:   See your medication sheet(s) for instructions.   Take all medicines as directed.  Make no changes unless your doctor suggests them.   Go to all your doctor visits.  Be sure to have all your required lab tests. This way, your medicines can be refilled on time.  Do not use any drugs not prescribed by your doctor.  Avoid alcohol.    Advance Directives:   Scanned document on file with [facility]? No scanned doc  Is document scanned? Pt states no documents  Honoring Choices Your Rights Handout: Informed and given  Was " more information offered? Pt declined    The Treatment team has appreciated the opportunity to work with you. If you have any questions or concerns about your recent admission, you can contact the unit which can receive your call 24 hours a day, 7 days a week. They will be able to get in touch with a Provider if needed. The unit number is [phone] .
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
## 2024-07-16 NOTE — CONSULTS
Completed Inpatient CD Consult. Recommendation for treatment is Residential. All recommendations are noted below.     Patient signed HUMA's to allow [facility] to send referrals to [facility].     Category of Substance Severity (ICD-10 Code / DSM 5 Code)      Alcohol Use Disorder The patient does not currently meet the criteria for an Alcohol use disorder, but has a history of use.   Cannabis Use Disorder The patient does not currently meet the criteria for an Cannabis use disorder, but has a history of use.   Hallucinogen Use Disorder The patient does not meet the criteria for a Hallucinogen use disorder.   Inhalant Use Disorder The patient does not meet the criteria for an Inhalant use disorder.   Opioid Use Disorder The patient does not meet the criteria for an Opioid use disorder.   Sedative, Hypnotic, or Anxiolytic Use Disorder The patient does not meet the criteria for a Sedative/Hypnotic use disorder.   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      Collateral information: PEGGY Collateral Info: Sufficient information is obtained from the patient to support diagnosis and recommendations. Contact with a collateral sources is not required.     Recommendations:   1)  Complete a residential based or similar chemical dependency treatment program and follow after care recommendations.   2)  Abstain from all mood-altering chemicals unless prescribed by a licensed provider who is aware of your substance use disorder.   3)  Attend weekly support group meetings, such as AA/NA, Celebrate Recovery, SMART Recovery, etc. and obtain a sponsor.   4)  Obtain a mental health diagnostic assessment and follow the provider recommendations.  5)  Follow all of the recommendations  of your treatment/medical providers.     Clinical Substantiation:  Patient lives in a setting with an ex wife which is antagonistic. Patient is employed but has missed work due to substance use. The patient is unable to maintain sobriety from methamphetamine and has started again using alcohol and cannabis on occasion. Patient is experiencing withdrawal symptoms and expresses there is a substance use issue. Patient meets the criteria for Stimulant Related Disorder Amphetamine type substance Severe as manifested by the following diagnostic criteria:  2) There is a persistent desire or unsuccessful efforts to cut down or control use of the substance.   3) A great deal of time is spent in activities necessary to obtain the substance, use the substance, or recover from its effects.   4) Craving, or a strong desire or urge to use the substance.   5) Recurrent use of the substance resulting in a failure to fulfill major role obligations at work, school, or home.   6) Continued use of the substance despite having persistent or recurrent social or interpersonal problems caused or exacerbated by the effects of its use.   8) Recurrent use of the substance in situations in which it is physically hazardous.   10) Tolerance, as defined by a need for markedly increased amounts of the substance to achieve intoxication or desired effect.     PEGGY consult completed by: Janes Ramos [facility].  Phone Number: [phone]  E-mail Address: [email or web address]     *Due to regulation of Title 42 of the Code of Federal Regulations (CFR) Part 2: Confidentiality laws apply to this note and the information wherein.  Thus, this note cannot be copy and pasted into any other health care staff's note nor can it be included in general medical records sent to ANY outside agency without the patient's written consent.

## 2024-07-16 NOTE — PROGRESS NOTES
"  [facility] PSYCHIATRY  -  PROGRESS NOTE     ID   Name: Bert Naidu  MRN#: [id number]     SUBJECTIVE   Prior to interviewing the patient, I met with nursing and reviewed patient's clinical condition. We discussed clinical care both before and after the interview. I have reviewed the patient's clinical course by review of records including previous notes, labs, and vital signs.     Per nursing, the patient had the following behavioral events over the last 24-hours: isolative to ICU    On psychiatric interview, Bert is met in his room. He is no longer responding to internal stimuli. He is easily frustrated when talking about his ex. He reports he is willing to meet with CD  today. We discussed him using fire arms while intoxicated on methamphetamine and he agrees to contact family to assist removal of firearm which is currently locked in his car. He circles back to ex and states he has been wanting her to move out for some time but she will not claiming she is a \"tenant\".  He states he does not have many support people in his life. We talked about how he can use this as an opportunity to get connected to individuals that can help him with his daily struggles.  He reluctantly states \"okay\". He denies SI, no plan, no intent.        MEDICATIONS   Scheduled Meds:  Current Facility-Administered Medications   Medication Dose Route Frequency Provider Last Rate Last Admin     PRN Meds:.  Current Facility-Administered Medications   Medication Dose Route Frequency Provider Last Rate Last Admin     acetaminophen (TYLENOL) tablet 650 mg  650 mg Oral Q4H PRN Francine Baptiste NP         alum & mag hydroxide-simethicone (MAALOX) suspension 30 mL  30 mL Oral Q4H PRN Francine Baptiste NP         hydrOXYzine HCl (ATARAX) tablet 25 mg  25 mg Oral Q4H PRN Francine Baptiste NP         melatonin tablet 3 mg  3 mg Oral At Bedtime PRN Francine Baptiste NP         nicotine (NICORETTE) gum 2 mg  2 mg Buccal Q1H PRN Emile" "FER Escamilla         OLANZapine (zyPREXA) tablet 10 mg  10 mg Oral TID PRN Francine Baptiste NP   10 mg at 07/15/24 1059    Or     OLANZapine (zyPREXA) injection 10 mg  10 mg Intramuscular TID PRN Francine Baptiste NP            ALLERGIES   No Known Allergies     VITALS   Vitals: /70   Pulse 77   Temp 98  F (36.7  C) (Temporal)   Resp 16   Ht 1.803 m (5' 11\")   Wt 78.2 kg (172 lb 4.8 oz)   SpO2 95%   BMI 24.03 kg/m       MENTAL STATUS EXAM   Appearance:  awake, alert, adequately groomed, dressed in hospital scrubs, and appeared as age stated  Attitude:  cooperative  Eye Contact:  good  Mood:  \"alright\"  Affect: flat   Speech:  clear, coherent  Psychomotor Behavior:  no evidence of tardive dyskinesia, dystonia, or tics  Thought Process:  logical, linear, and goal oriented  Associations:  no loose associations  Thought Content:  patient appears to be responding to internal stimuli  Insight:  limited  Judgment:  limited  Oriented to:  time, person, and place  Attention Span and Concentration:  intact  Recent and Remote Memory:  intact  Gait and Station: Normal        LABS   No results found for this or any previous visit (from the past 24 hour(s)).      ASSESSMENT   Tee Naidu is a 48 year old male with a past psychiatric history notable for depression, anxiety, and polysubstance abuse.  Presents to emergency department by way of law enforcement after being disorganized in his yard, reportedly shooting off a firearm, intoxicated on methamphetamine. Patient was subsequently transferred and accepted for inpatient psychiatric hospitalization at [facility] for further safety and further stabilization        Daily Progress: continue olanzapine 10 mg at bedtime - helping his thought process. He is less paranoid today. The methamphetamine is also clearing from his system. Will order CD consult today.          DIAGNOSTIC FORMULATION   #Affective Psychosis  #Stimulant " (Methamphetamine) Use Disorder, Severe     PLAN     Location: Unit 5  Legal Status: Orders Placed This Encounter      Voluntary    Safety Assessment:    Behavioral Orders   Procedures     Code 1 - Restrict to Unit     [facility] Extended Care     Until discharge, Extended Care to offer psychotherapeutic services to mental health patients boarding for admission or stabilization. These services are to include but are not limited to: individual psychotherapy, diagnostic assessment, case management and care planning, safety planning, etc. This may include up to 1 visit per day. If patient is physically located at [facility] or [facility], group psychotherapy up to 2 time per day may be offered.      Routine Programming     As clinically indicated     Status 15     Every 15 minutes.        PTA medications held:   -none     PTA medications continued/changed:   -none     New medications initiated:   -olanzapine 10 mg at bedtime , PRN available    Today's Changes:  - continue olanzapine 10 mg at bedtime     Programming: Patient will be treated in a therapeutic milieu with appropriate individual and group therapies. Education will be provided on diagnoses, medications, and treatments. Encouraged behavioral activation and participation in group programming.     Medical diagnoses:  Per medicine    Disposition: pending clinical course        TREATMENT TEAM CARE PLAN     Progress: Continued symptoms.    Continued Stay Criteria/Rationale: Continued symptoms without sufficient improvement/resolution.    Medical/Physical: See above.    Precautions: See above.     Plan: Continue inpatient care with unit support and medication management.    Rationale for change in precautions or plan: NA due to no change.    Participants: Yahir Bauer MD, Nursing, SW, OT.    The patient's care was discussed with the treatment team and chart notes were reviewed.       ATTESTATION    Yahir Bauer MD  [facility]    Type of Service: video  visit for mental health treatment  Reason for Video Visit: COVID-19 [place]  Originating Site (patient location): [facility]  Distant Site (provider location): Remote Location  Mode of Communication: Video Conference via [employer]  Time of Service: Date: 07/16/2024 , Start: 08:00,  Stop: 08:30

## 2024-07-16 NOTE — PROGRESS NOTES
Lencho rescheduled  appointment that pt missed yesterday.      [facility]/ [facility]   July 30 @ 3:30pm Arrive at 3:15PM.   [facility] [address] [place] [id number] [phone]      Lencho also called [place] and . They did not secure guns as there was no evidence that they needed to me taken at that time. Pt lives in [facility] and can legal shot in his yard.

## 2024-07-17 VITALS
SYSTOLIC BLOOD PRESSURE: 123 MMHG | RESPIRATION RATE: 16 BRPM | OXYGEN SATURATION: 97 % | TEMPERATURE: 97.6 F | WEIGHT: 172.3 LBS | BODY MASS INDEX: 24.12 KG/M2 | HEIGHT: 71 IN | DIASTOLIC BLOOD PRESSURE: 82 MMHG | HEART RATE: 70 BPM

## 2024-07-17 PROCEDURE — 250N000013 HC RX MED GY IP 250 OP 250 PS 637: Performed by: PSYCHIATRY & NEUROLOGY

## 2024-07-17 PROCEDURE — 99239 HOSP IP/OBS DSCHRG MGMT >30: CPT | Mod: 95 | Performed by: PSYCHIATRY & NEUROLOGY

## 2024-07-17 RX ORDER — OLANZAPINE 10 MG/1
10 TABLET ORAL AT BEDTIME
Qty: 30 TABLET | Refills: 0 | Status: SHIPPED | OUTPATIENT
Start: 2024-07-17 | End: 2024-08-16

## 2024-07-17 RX ORDER — POLYETHYLENE GLYCOL 3350 17 G
2 POWDER IN PACKET (EA) ORAL
Status: DISCONTINUED | OUTPATIENT
Start: 2024-07-17 | End: 2024-07-17 | Stop reason: HOSPADM

## 2024-07-17 RX ADMIN — NICOTINE POLACRILEX 2 MG: 2 LOZENGE ORAL at 10:52

## 2024-07-17 RX ADMIN — NICOTINE POLACRILEX 2 MG: 2 LOZENGE ORAL at 08:55

## 2024-07-17 ASSESSMENT — ACTIVITIES OF DAILY LIVING (ADL)
ADLS_ACUITY_SCORE: 28
HYGIENE/GROOMING: INDEPENDENT
ADLS_ACUITY_SCORE: 28

## 2024-07-17 NOTE — DISCHARGE SUMMARY
[facility] PSYCHIATRY  DISCHARGE SUMMARY     DISCHARGE DATA     Bert Naidu MRN# [id number]   Age: 48 year old YOB: 1975     Date of Admission: 7/14/2024  Date of Discharge: July 17, 2024  Discharge Provider: Yahir Bauer MD       REASON FOR ADMISSION   Bert Naidu is a 48 year old male with a past psychiatric history notable for depression, anxiety, and polysubstance abuse. Presents to emergency department by way of law enforcement after being disorganized in his yard, reportedly shooting off a firearm, intoxicated on methamphetamine. Patient was subsequently transferred and accepted for inpatient psychiatric hospitalization at [facility] for further safety and further stabilization        DISCHARGE DIAGNOSES   #Affective Psychosis  #Stimulant (Methamphetamine) Use Disorder, Severe     CONSULTS   CD consult  - recommending residential CD treatment, referrals placed. Patient was offered door to door however he declined wishing to pursue these as an outpatient.        HOSPITAL COURSE   Patient was admitted to unit 5 due to the aforementioned presentation. The patient was placed under 15 minute checks to ensure patient safety. The patient participated in unit programming and groups as able.    Legal status during hospitalization was involuntary .Mr. Naidu did not require seclusion/restraint during hospitalization.     We reviewed with Mr. Naidu current and past medication trials including duration, dose, response and side effects. During this hospitalization, the following changes to the patient's psychotropic medications were made:    PTA medications held:   -none     PTA medications continued/changed:   -none     New medications initiated:   -olanzapine 10 mg at bedtime        Mr. Naidu progressed slowly at first related to response to medication changes, urgency to leave due to family and work pressure, confidence in skills to manage symptoms,  establishment of a supportive community network, abstaining from further substance use, and psychosocial stressors outside hospital  . By the time of discharge, his symptoms had improved adequately.  Psychosis improved with adequate outpatient plan in place. and Psychotropic medications utilized were found to be helpful without significant adverse side effects. The treatment goals (long-term goal/discharge criteria) were reached.     With the aforementioned changes and supports the patient noticed improvement in their symptoms and felt sufficiently ready for discharge. As a result, Bert Naidu was discharged. At the time of discharge, Bert Naidu was determined to not be a danger to self or others. The patient was also medically stable for discharge. At the current time of discharge, the patient does not meet criteria for involuntary hospitalization. On the day of discharge, the patient reports that they do not have suicidal or homicidal ideation. Steps taken to minimize risk include: assessing patient s behavior and thought process daily during hospital stay, discharging patient with adequate plan for follow up for mental and physical health and discussing safety plan of returning to the hospital should the patient ever have thoughts of harming themselves or others. Therefore, based on all available evidence including the factors cited above, the patient does not appear to be at imminent risk for self-harm, and is appropriate for outpatient level of care. The acute crisis is resolved and Bert is discharging in improved condition. Though Bert's acute crisis has resolved, there remains a higher risk of suicide over the long term compared to the general population. Factors that may be associated with relapse include worsening of depressive symptoms, alcohol use, illicit substance use, not taking medications, lack of mental health follow-up appointments and work/family/relationship stressors. Bert Naidu has a  "plan in place to mitigate these stressors, is hopeful about the future ,and is not assessed to be a imminent risk to self or anyone else and thus is not assessed to be holdable.  Bert has received maximal benefit from the current hospital stay. Bert Naidu set to discharge.     *son removed firearm prior to dismissal.        DISCHARGE MEDICATIONS     Current Discharge Medication List        START taking these medications    Details   OLANZapine (ZYPREXA) 10 MG tablet Take 1 tablet (10 mg) by mouth at bedtime for 30 days  Qty: 30 tablet, Refills: 0    Associated Diagnoses: Amphetamine-induced psychotic disorder, with delusions (H)           CONTINUE these medications which have NOT CHANGED    Details   Multiple Vitamins-Minerals (MULTIVITAMIN GUMMIES ADULT PO) Take 1 Dose by mouth daily                VITALS   Vitals: /82   Pulse 70   Temp 97.6  F (36.4  C) (Temporal)   Resp 16   Ht 1.803 m (5' 11\")   Wt 78.2 kg (172 lb 4.8 oz)   SpO2 97%   BMI 24.03 kg/m       MENTAL STATUS EXAM   Appearance: Alert, oriented, dressed in hospital scrubs, appears stated age   Attitude: Cooperative   Eye Contact: Good  Mood: \"Better\"  Affect: Full range of affect  Speech: Normal rate and rhythm   Psychomotor Behavior: No tremor, rigidity, or psychomotor abnormality   Thought Process: Logical, goal directed   Associations: No loose associations   Thought Content: Denies SI or plan. No SIB. Denies A/V hallucinations. No evidence of delusional thought.  Insight: Good  Judgment: Good  Oriented to: Person, place, and time  Attention Span and Concentration: Intact  Recent and Remote Memory: Intact  Language: English with appropriate syntax and vocabulary  Fund of Knowledge: Average  Muscle Strength and Tone: Grossly normal  Gait and Station: Grossly normal       DISCHARGE PLAN     1.  Education given regarding diagnostic and treatment options with risks, benefits and alternatives with adequate verbalization of " understanding.  2.  Discharge to home. Upon detailed review of risk factors, patient amenable for release.   3.  Continue aforementioned medications and associated medication changes with follow-up by outpatient provider.  4.  Crisis management planning in place.    5.  Nursing and  to review further discharge recommendations.   6.  Active issues: No active medical issues requiring immediate follow-up care.  7.  Patient is being discharged with the following appointments as detailed below:    See AVS       DISCHARGE SERVICES PROVIDED     80 minutes spent on discharge services, including:  Final examination of patient.  Review and discussion of hospital stay.  Instructions for continued outpatient care/goals.  Preparation of discharge records.  Preparation of medications refills and new prescriptions.  Preparation of applicable referral forms.        ATTESTATION   Yahir Bauer MD  [facility]   Psychiatry  Type of Service: video visit for mental health treatment  Reason for Video Visit: COVID-19 and [place]  Originating Site (patient location): [facility]  Distant Site (provider location): Remote Location  Mode of Communication: Video Conference via [employer]  Time of Service: Date: July 17, 2024 , Start: 07:00,  Stop: 08:00     LABS THIS ADMISSION     Results for orders placed or performed during the hospital encounter of 07/14/24   Comprehensive metabolic panel     Status: Abnormal   Result Value Ref Range    Sodium 146 (H) 135 - 145 mmol/L    Potassium 3.7 3.4 - 5.3 mmol/L    Carbon Dioxide (CO2) 26 22 - 29 mmol/L    Anion Gap 9 7 - 15 mmol/L    Urea Nitrogen 12.7 6.0 - 20.0 mg/dL    Creatinine 1.29 (H) 0.67 - 1.17 mg/dL    GFR Estimate 68 >60 mL/min/1.73m2    Calcium 8.9 8.6 - 10.0 mg/dL    Chloride 111 (H) 98 - 107 mmol/L    Glucose 131 (H) 70 - 99 mg/dL    Alkaline Phosphatase 81 40 - 150 U/L    AST 30 0 - 45 U/L    ALT 15 0 - 70 U/L    Protein Total 7.4 6.4 -  8.3 g/dL    Albumin 4.6 3.5 - 5.2 g/dL    Bilirubin Total 0.6 <=1.2 mg/dL   Ethyl Alcohol Level     Status: Normal   Result Value Ref Range    Alcohol ethyl <0.01 <=0.01 g/dL   CBC with platelets and differential     Status: Abnormal   Result Value Ref Range    WBC Count 4.9 4.0 - 11.0 10e3/uL    RBC Count 4.24 (L) 4.40 - 5.90 10e6/uL    Hemoglobin 13.3 13.3 - 17.7 g/dL    Hematocrit 40.0 40.0 - 53.0 %    MCV 94 78 - 100 fL    MCH 31.4 26.5 - 33.0 pg    MCHC 33.3 31.5 - 36.5 g/dL    RDW 13.4 10.0 - 15.0 %    Platelet Count 230 150 - 450 10e3/uL    % Neutrophils 67 %    % Lymphocytes 19 %    % Monocytes 11 %    % Eosinophils 2 %    % Basophils 1 %    % Immature Granulocytes 0 %    NRBCs per 100 WBC 0 <1 /100    Absolute Neutrophils 3.3 1.6 - 8.3 10e3/uL    Absolute Lymphocytes 0.9 0.8 - 5.3 10e3/uL    Absolute Monocytes 0.6 0.0 - 1.3 10e3/uL    Absolute Eosinophils 0.1 0.0 - 0.7 10e3/uL    Absolute Basophils 0.0 0.0 - 0.2 10e3/uL    Absolute Immature Granulocytes 0.0 <=0.4 10e3/uL    Absolute NRBCs 0.0 10e3/uL   Extra Tube     Status: None    Narrative    The following orders were created for panel order Extra Tube.  Procedure                               Abnormality         Status                     ---------                               -----------         ------                     Extra Blue Top Tube[[id number]]                              Final result               Extra Red Top Tube[[id number]]                               Final result               Extra Heparinized Syringe[[id number]]                        Final result                 Please view results for these tests on the individual orders.   Extra Blue Top Tube     Status: None   Result Value Ref Range    Hold Specimen JIC    Extra Red Top Tube     Status: None   Result Value Ref Range    Hold Specimen JIC    Extra Heparinized Syringe     Status: None   Result Value Ref Range    Hold Specimen Ok    UA with Microscopic reflex to Culture     Status:  Abnormal    Specimen: Urine, NOS   Result Value Ref Range    Color Urine Yellow Colorless, Straw, Light Yellow, Yellow    Appearance Urine Clear Clear    Glucose Urine 30 (A) Negative mg/dL    Bilirubin Urine Negative Negative    Ketones Urine Negative Negative mg/dL    Specific Gravity Urine 1.030 1.003 - 1.035    Blood Urine Negative Negative    pH Urine 5.5 4.7 - 8.0    Protein Albumin Urine 30 (A) Negative mg/dL    Urobilinogen Urine Normal Normal, 2.0 mg/dL    Nitrite Urine Negative Negative    Leukocyte Esterase Urine Small (A) Negative    Mucus Urine Present (A) None Seen /LPF    RBC Urine <1 <=2 /HPF    WBC Urine 6 (H) <=5 /HPF    Squamous Epithelials Urine 0 <=1 /HPF    Narrative    Urine Culture not indicated   Urine Drug Screen Panel     Status: Abnormal   Result Value Ref Range    Amphetamines Urine Screen Positive (A) Screen Negative    Barbituates Urine Screen Negative Screen Negative    Benzodiazepine Urine Screen Negative Screen Negative    Cannabinoids Urine Screen Negative Screen Negative    Cocaine Urine Screen Negative Screen Negative    Fentanyl Qual Urine Screen Negative Screen Negative    Opiates Urine Screen Negative Screen Negative    PCP Urine Screen Negative Screen Negative   CBC with platelets differential     Status: Abnormal    Narrative    The following orders were created for panel order CBC with platelets differential.  Procedure                               Abnormality         Status                     ---------                               -----------         ------                     CBC with platelets and d...[[id number]]  Abnormal            Final result                 Please view results for these tests on the individual orders.   Urine Drug Screen     Status: Abnormal    Narrative    The following orders were created for panel order Urine Drug Screen.  Procedure                               Abnormality         Status                     ---------                                -----------         ------                     Urine Drug Screen Panel[[id number]]      Abnormal            Final result                 Please view results for these tests on the individual orders.

## 2024-07-17 NOTE — PROGRESS NOTES
Met with patient. Obtained HUMA's for [facility], [facility], [facility], and [facility] to allow discharge summary to be released should document be requested.     Patient informed that referrals have been sent and programs will be asked to contact patient directly should they contact [facility].     Patient advised to plan to contact CD programs to which referrals were sent later this week. Patient informed phone numbers and program names are on the AVS.     Patient asked about records release. Patient informed medical records can be obtained by contacting [facility] post discharge and AVS would be provided at discharge.     Patient asked questions about employment/legal and was advised to discuss questions with SW. Informed SW informed of patient desire to ask additional questions.     [facility] requested updated progress notes and these were sent along with the updated HUMA signed today.

## 2024-07-17 NOTE — PROGRESS NOTES
Pt is discharging at the recommendation of the treatment team. Pt is discharging to home transported by son. Pt denies having any thoughts of hurting themself or anyone else. Pt denies anxiety or depression. Pt has follow up with Josemanuel Joseph . Discharge instructions, including; demographic sheet, psychiatric evaluation, discharge summary, and AVS were faxed to these next level of care providers.

## 2024-07-17 NOTE — PLAN OF CARE
Face to face end of shift report will be communicated to oncoming RN.     Problem: Adult Behavioral Health Plan of Care  Goal: Patient-Specific Goal (Individualization)  Description: Pt will follow recommendations of treatment team.  Pt will be compliant with medications.  Pt will attend 50 % of groups.  Pt will sleep 6-8 hours each night.  7/16.24- Pt may  wean if behavior remains appropriate. Provider and treatment team to assess daily  Outcome: Progressing     Problem: Thought Process Alteration  Goal: Optimal Thought Clarity  Description: Pt will have a decrease in hallucinations.  Pt will have a logical and linear conversation.  Outcome: Progressing  Face to face end of shift report obtained by MIKALA Young. Pt observed resting in bed.  Pt appears to be sleeping in bed with eyes closed. 15 minutes and PRN safety checks completed with no noted complains. No delusional statements noted or reported so far this shift.   0600-Pt appeared to had slept 6.5 hours.

## 2024-07-17 NOTE — PLAN OF CARE
Face to face shift report received from Clari KNOWLES RN. Rounding completed, pt observed.    Problem: Adult Behavioral Health Plan of Care  Goal: Patient-Specific Goal (Individualization)  Description: Pt will follow recommendations of treatment team.  Pt will be compliant with medications.  Pt will attend 50 % of groups.  Pt will sleep 6-8 hours each night.  7/16.24- Pt may  wean if behavior remains appropriate. Provider and treatment team to assess daily  Outcome: Progressing  Note: Shift Summary: Discharge Note    Patient Discharged to home on 7/17/2024 12:13 PM via Private Car accompanied by son.     Patient informed of discharge instructions in AVS. patient verbalizes understanding and denies having any questions pertaining to AVS. Patient stable at time of discharge. Patient denies SI, HI, and thoughts of self harm at time of discharge. All personal belongings returned to patient. Discharge prescriptions sent to [facility] via electronic communication. Psych evaluation, history and physical, AVS, and discharge summary faxed to next level of care- by  and TREVOR.     Ena Leong RN  7/17/2024  12:13 PM       Problem: Thought Process Alteration  Goal: Optimal Thought Clarity  Description: Pt will have a decrease in hallucinations.  Pt will have a logical and linear conversation.  Outcome: Progressing  Face to face report will be communicated to oncoming RN.    Ena Leong RN  7/17/2024

## 2024-07-17 NOTE — PLAN OF CARE
Problem: Adult Behavioral Health Plan of Care  Goal: Plan of Care Review  Outcome: Adequate for Care Transition  Goal: Patient-Specific Goal (Individualization)  Description: Pt will follow recommendations of treatment team.  Pt will be compliant with medications.  Pt will attend 50 % of groups.  Pt will sleep 6-8 hours each night.  7/16.24- Pt may  wean if behavior remains appropriate. Provider and treatment team to assess daily  7/17/2024 1102 by Ena Leong RN  Outcome: Adequate for Care Transition  7/17/2024 0803 by Ena Leong RN  Outcome: Progressing  Note: Shift Summary:  Goal: Adheres to Safety Considerations for Self and Others  Outcome: Adequate for Care Transition  Goal: Absence of New-Onset Illness or Injury  Outcome: Adequate for Care Transition  Goal: Optimized Coping Skills in Response to Life Stressors  Outcome: Adequate for Care Transition  Goal: Develops/Participates in Therapeutic [place] to Support Successful Transition  Outcome: Adequate for Care Transition     Problem: Thought Process Alteration  Goal: Optimal Thought Clarity  Description: Pt will have a decrease in hallucinations.  Pt will have a logical and linear conversation.  7/17/2024 1102 by Ena Leong RN  Outcome: Adequate for Care Transition  7/17/2024 0803 by Ena Leong RN  Outcome: Progressing   Goal Outcome Evaluation:

## 2024-07-17 NOTE — PROGRESS NOTES
Lencho called pt's son Jorge Luis to confirm guns. Son stated that guns were off his property and in his control. Son is picking pt up at 11:30am.

## 2024-07-18 ENCOUNTER — TELEPHONE (OUTPATIENT)
Dept: BEHAVIORAL HEALTH | Facility: HOSPITAL | Age: 49
End: 2024-07-18

## 2024-07-18 NOTE — TELEPHONE ENCOUNTER
"[facility]: Post-Hospital Discharge Note     Situation   Post hospital discharge call placed 07/18/24.      Bert did not answer. A message was left.  Messages are left with a call back number should they need to reach staff with any questions, need for additional resources, or need assistance setting up a post hospitalization follow up appointments, if unable to do so independently.    Inpatient Mental Health Admission Information:  Admission Date: 7/14/24  Admission Reason: Bert Naidu is a 48 year old male with a past psychiatric history notable for depression, anxiety, and polysubstance abuse. Presents to emergency department by way of law enforcement after being disorganized in his yard, reportedly shooting off a firearm, intoxicated on methamphetamine. Patient was subsequently transferred and accepted for inpatient psychiatric hospitalization at [facility] for further safety and further stabilization        Inpatient Mental Health Discharge Information:  Discharge Date: 7/17/24  Discharged to: home/ self care  Discharge Diagnosis: Affective Psychosis  #Stimulant (Methamphetamine) Use Disorder, Severe    Background    The following information is obtained from the hospital after visit summary and inpatient provider notes.     \"Patient was admitted to unit 5 due to the aforementioned presentation. The patient was placed under 15 minute checks to ensure patient safety. The patient participated in unit programming and groups as able.     Legal status during hospitalization was involuntary .Mr. Naidu did not require seclusion/restraint during hospitalization.      We reviewed with Mr. Naidu current and past medication trials including duration, dose, response and side effects. During this hospitalization, the following changes to the patient's psychotropic medications were made:    PTA medications held:   -none     PTA medications continued/changed:   -none     New " medications initiated:   -olanzapine 10 mg at bedtime         Mr. Naidu progressed slowly at first related to response to medication changes, urgency to leave due to family and work pressure, confidence in skills to manage symptoms, establishment of a supportive community network, abstaining from further substance use, and psychosocial stressors outside hospital  . By the time of discharge, his symptoms had improved adequately.  Psychosis improved with adequate outpatient plan in place. and Psychotropic medications utilized were found to be helpful without significant adverse side effects. The treatment goals (long-term goal/discharge criteria) were reached.      With the aforementioned changes and supports the patient noticed improvement in their symptoms and felt sufficiently ready for discharge. As a result, Bert Naidu was discharged. At the time of discharge, Bert Naidu was determined to not be a danger to self or others. The patient was also medically stable for discharge. At the current time of discharge, the patient does not meet criteria for involuntary hospitalization. On the day of discharge, the patient reports that they do not have suicidal or homicidal ideation. Steps taken to minimize risk include: assessing patient s behavior and thought process daily during hospital stay, discharging patient with adequate plan for follow up for mental and physical health and discussing safety plan of returning to the hospital should the patient ever have thoughts of harming themselves or others. Therefore, based on all available evidence including the factors cited above, the patient does not appear to be at imminent risk for self-harm, and is appropriate for outpatient level of care. The acute crisis is resolved and Bert is discharging in improved condition. Though Bert's acute crisis has resolved, there remains a higher risk of suicide over the long term compared to the general population. Factors that may  "be associated with relapse include worsening of depressive symptoms, alcohol use, illicit substance use, not taking medications, lack of mental health follow-up appointments and work/family/relationship stressors. Bert Naidu has a plan in place to mitigate these stressors, is hopeful about the future ,and is not assessed to be a imminent risk to self or anyone else and thus is not assessed to be holdable.  Bert has received maximal benefit from the current hospital stay. Bert Naidu set to discharge.      *son removed firearm prior to dismissal.\"        Post Discharge Assessment   How have your symptoms been since being discharged from the hospital? Message left to call with any questions or concerns  Do you have your discharge instructions/after visit summary? NA  Do you have any questions related to your discharge instructions? NA    Discharge Medication Assessment   Medications were reviewed in full on discharge, including: Medications to be started, medications to be stopped, medications to be continued from preadmission and any side effects.      Prescriptions were e-scribed or sent to their preferred pharmacy at discharge and were able to be filled: NA  Do you have any questions about your medications? NA    Outpatient Plan/Future Appointments  Discharge follow up appointment scheduled within 14 days of discharging from hospital? Yes      [facility]   July 30 @ 3:30pm Arrive at 3:15PM.   [address] [place] [id number] [phone]      Further information, barriers, or follow up this writer has addressed: N/A    "

## 2024-07-18 NOTE — PROGRESS NOTES
Yanet asked for updated progress notes. Sent discharge summary and progress note from 7/16 as [facility] is on file. [facility] informed to contact patient directly for screening and/or admission.

## 2024-12-22 ENCOUNTER — HOSPITAL ENCOUNTER (EMERGENCY)
Facility: HOSPITAL | Age: 49
Discharge: HOME | End: 2024-12-22
Attending: NURSE PRACTITIONER | Admitting: NURSE PRACTITIONER
Payer: COMMERCIAL

## 2024-12-22 ENCOUNTER — APPOINTMENT (OUTPATIENT)
Dept: GENERAL RADIOLOGY | Facility: HOSPITAL | Age: 49
End: 2024-12-22
Attending: NURSE PRACTITIONER
Payer: COMMERCIAL

## 2024-12-22 VITALS
DIASTOLIC BLOOD PRESSURE: 75 MMHG | RESPIRATION RATE: 15 BRPM | OXYGEN SATURATION: 98 % | HEART RATE: 109 BPM | SYSTOLIC BLOOD PRESSURE: 114 MMHG | TEMPERATURE: 98.3 F

## 2024-12-22 DIAGNOSIS — J10.1 INFLUENZA A: ICD-10-CM

## 2024-12-22 DIAGNOSIS — J18.9 PNEUMONIA OF LEFT LOWER LOBE DUE TO INFECTIOUS ORGANISM: Primary | ICD-10-CM

## 2024-12-22 DIAGNOSIS — J18.9 LEFT LOWER LOBE PNEUMONIA: ICD-10-CM

## 2024-12-22 LAB
FLUAV RNA SPEC QL NAA+PROBE: POSITIVE
FLUBV RNA RESP QL NAA+PROBE: NEGATIVE
RSV RNA SPEC NAA+PROBE: NEGATIVE
SARS-COV-2 RNA RESP QL NAA+PROBE: NEGATIVE

## 2024-12-22 PROCEDURE — 87637 SARSCOV2&INF A&B&RSV AMP PRB: CPT | Performed by: NURSE PRACTITIONER

## 2024-12-22 PROCEDURE — 71045 X-RAY EXAM CHEST 1 VIEW: CPT

## 2024-12-22 PROCEDURE — G0463 HOSPITAL OUTPT CLINIC VISIT: HCPCS | Mod: 25

## 2024-12-22 PROCEDURE — 99213 OFFICE O/P EST LOW 20 MIN: CPT | Performed by: NURSE PRACTITIONER

## 2024-12-22 RX ORDER — AMOXICILLIN 500 MG/1
1000 CAPSULE ORAL 3 TIMES DAILY
Qty: 30 CAPSULE | Refills: 0 | Status: SHIPPED | OUTPATIENT
Start: 2024-12-22 | End: 2024-12-27

## 2024-12-22 RX ORDER — AZITHROMYCIN 250 MG/1
TABLET, FILM COATED ORAL
Qty: 6 TABLET | Refills: 0 | Status: SHIPPED | OUTPATIENT
Start: 2024-12-22 | End: 2024-12-27

## 2024-12-22 ASSESSMENT — ENCOUNTER SYMPTOMS
SORE THROAT: 0
EYE DISCHARGE: 0
TROUBLE SWALLOWING: 0
HEADACHES: 0
EYE REDNESS: 0
DIARRHEA: 1
FEVER: 1
COUGH: 1
PSYCHIATRIC NEGATIVE: 1
ABDOMINAL PAIN: 0
MYALGIAS: 1
SHORTNESS OF BREATH: 0
VOMITING: 0
CHILLS: 0
NAUSEA: 0
RHINORRHEA: 1

## 2024-12-22 ASSESSMENT — ACTIVITIES OF DAILY LIVING (ADL): ADLS_ACUITY_SCORE: 41

## 2024-12-22 NOTE — DISCHARGE INSTRUCTIONS
Amoxicillin and Azithromycin as ordered  - Take entire course of antibiotic even if you start to feel better.  - Antibiotics can cause stomach upset including nausea and diarrhea. Read your bottle or ask the pharmacist if antibiotic can be taken with food to help prevent nausea. If you have symptoms of diarrhea you can take an over-the-counter probiotic and/or increase foods with probiotics such as yogurt, Yonis, sauerkraut.    Alternate Tylenol and ibuprofen as needed for pain or fever    Push fluids to stay hydrated    Over-the-counter Flonase as needed for congestion    Follow-up with primary care provider or return to urgent care/ED with any worsening in condition or additional concerns.

## 2024-12-22 NOTE — Clinical Note
Bert Naidu was seen and treated in our emergency department on 12/22/2024.    Please excuse from work on 12/20/2024.  Thank you     Sincerely,     [facility]

## 2024-12-22 NOTE — ED TRIAGE NOTES
Pt presents with c/o body aches. States that he has a little diarrhea. States it came and then it was gone. Sx started this morning. Denies known exposures that he knows of. Reports that he has been sick since Friday. States it feels like he's getting better and then goes back to being sick. Reports he would like abx. No otc meds today. Took advil yesterday. Pt refuses multiplex testing at this time.

## 2024-12-22 NOTE — ED PROVIDER NOTES
History     Chief Complaint   Patient presents with    Flu Symptoms     HPI  Bert Naidu is a 49 year old male who presents to urgent care today ambulatory with complaints of fever, congestion, rhinorrhea, cough, myalgia and 1 episode of diarrhea.  Symptoms started 3 days ago.  No asthma/COPD.  Current smoker.  Staying hydrated.  No rashes.  No OTC meds today.  No other concerns.    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    Past Surgical History:    No past surgical history on file.    Family History:    No family history on file.    Social History:  Marital Status:  Single [1]  Social History     Tobacco Use    Smoking status: Every Day     Types: Vaping Device    Smokeless tobacco: Former     Types: Chew   Substance Use Topics    Alcohol use: Yes     Comment: occasional    Drug use: Not Currently     Types: Methamphetamines, Marijuana     Comment: within the last week        Medications:    amoxicillin (AMOXIL) 500 MG capsule  azithromycin (ZITHROMAX Z-STORM) 250 MG tablet  Multiple Vitamins-Minerals (MULTIVITAMIN GUMMIES ADULT PO)  OLANZapine (ZYPREXA) 10 MG tablet      Review of Systems   Constitutional:  Positive for fever. Negative for chills.   HENT:  Positive for congestion and rhinorrhea. Negative for ear pain, sore throat and trouble swallowing.    Eyes:  Negative for discharge and redness.   Respiratory:  Positive for cough. Negative for shortness of breath.    Cardiovascular:  Negative for chest pain.   Gastrointestinal:  Positive for diarrhea. Negative for abdominal pain, nausea and vomiting.   Genitourinary:  Negative for decreased urine volume.    Musculoskeletal:  Positive for myalgias. Negative for gait problem.   Skin:  Negative for rash.   Neurological:  Negative for headaches.   Psychiatric/Behavioral: Negative.       Physical Exam   BP: 114/75  Pulse: 109  Temp: 98.3  F (36.8  C)  Resp: 15  SpO2: 98 %    Physical Exam  Vitals and nursing note reviewed.   Constitutional:       General: He is not in acute distress.     Appearance: Normal appearance. He is not ill-appearing or toxic-appearing.   HENT:      Right Ear: Tympanic membrane, ear canal and external ear normal.      Left Ear: Tympanic membrane, ear canal and external ear normal.      Nose: Congestion present. No rhinorrhea.      Mouth/Throat:      Mouth: Mucous membranes are moist.      Pharynx: Oropharynx is clear. No oropharyngeal exudate or posterior oropharyngeal erythema.   Cardiovascular:      Rate and Rhythm: Regular rhythm. Tachycardia present.      Pulses: Normal pulses.      Heart sounds: Normal heart sounds.   Pulmonary:      Effort: Pulmonary effort is normal.      Breath sounds: Examination of the left-lower field reveals wheezing. Wheezing present.   Abdominal:      General: Bowel sounds are normal.      Palpations: Abdomen is soft.   Musculoskeletal:      Cervical back: Normal range of motion and neck supple. No rigidity or tenderness.   Lymphadenopathy:      Cervical: No cervical adenopathy.   Skin:     General: Skin is warm and dry.      Capillary Refill: Capillary refill takes less than 2 seconds.   Neurological:      Mental Status: He is alert.   Psychiatric:         Mood and Affect: Mood normal.       ED Course     Procedures    Results for orders placed or performed during the hospital encounter of 12/22/24 (from the past 24 hours)   Influenza A/B, RSV and SARS-CoV2 PCR (COVID-19) Nasopharyngeal    Specimen: Nasopharyngeal; Swab   Result Value Ref Range    Influenza A PCR Positive (A) Negative    Influenza B PCR Negative Negative    RSV PCR Negative Negative    SARS CoV2 PCR  Negative Negative    Narrative    Testing was performed using the Xpert Xpress CoV2/Flu/RSV Assay on the [employer] GeneXpert Instrument. This test should be ordered for the detection of SARS-CoV2, influenza, and RSV viruses in individuals with signs and symptoms of respiratory tract infection. This test is for in vitro diagnostic use under the [place] FDA for laboratories certified under CLIA to perform high or moderate complexity testing. This test has been [place] FDA cleared. A negative result does not rule out the presence of PCR inhibitors in the specimen or target RNA in concentration below the limit of detection for the assay. If only one viral target is positive but coinfection with multiple targets is suspected, the sample should be re-tested with another FDA cleared, approved, or authorized test, if coninfection would change clinical management. This test was validated by the [facility]. These laboratories are certified under the Clinical Laboratory Improvement Amendments of 1988 (CLIA-88) as qualified to perfom high complexity laboratory testing.   XR Chest Port 1 View    Narrative    EXAM: XR CHEST PORT 1 VIEW  LOCATION: [facility]  DATE: 12/22/2024    INDICATION: cough fever  COMPARISON: None.      Impression    IMPRESSION: Cardiac silhouette is within normal limits. Mild patchy left lung base opacity suspicious for pneumonia. No pleural effusion or pneumothorax.       Medications - No data to display    Assessments & Plan (with Medical Decision Making)     I have reviewed the nursing notes.    I have reviewed the findings, diagnosis, plan and need for follow up with the patient.  (J10.1) Influenza A  (J18.9) Left lower lobe pneumonia  Plan:   Patient ambulatory with a nontoxic appearance.  Expiratory wheezes noted to left lower lobe, x-ray shows mild patchy left lower lung opacity consistent with pneumonia.  No signs of otitis media or strep.  Moderate congestion.  Staying hydrated.   No rashes.  No abdominal pain or tenderness.  COVID, influenza and RSV test completed, patient positive for influenza A.  Reviewed viral pneumonia versus bacterial pneumonia, patient has been sick for 3 days in duration leaning more towards a viral pneumonia at this time.  Reviewed risk versus benefits of antibiotics, patient would like to proceed with treatment for pneumonia at this time.  Will start patient on amoxicillin and azithromycin.  Alternate Tylenol and ibuprofen as needed for pain or fever.  Push fluids to stay hydrated.  Over-the-counter Flonase as needed for congestion.  Warm salt water gargles or honey as needed for sore throat.  Follow-up with primary care provider or return to urgent care/ED with any worsening in condition or additional concerns.  Patient in agreement treatment plan.    Discharge Medication List as of 12/22/2024  3:38 PM        START taking these medications    Details   amoxicillin (AMOXIL) 500 MG capsule Take 2 capsules (1,000 mg) by mouth 3 times daily for 5 days., Disp-30 capsule, R-0, E-Prescribe      azithromycin (ZITHROMAX Z-STORM) 250 MG tablet Two tablets on the first day, then one tablet daily for the next 4 days, Disp-6 tablet, R-0, E-Prescribe           Final diagnoses:   Influenza A   Left lower lobe pneumonia     12/22/2024   [facility]       Sidra Tovar, NP  12/22/24 [id number]